# Patient Record
Sex: FEMALE | Race: BLACK OR AFRICAN AMERICAN | NOT HISPANIC OR LATINO | Employment: OTHER | URBAN - METROPOLITAN AREA
[De-identification: names, ages, dates, MRNs, and addresses within clinical notes are randomized per-mention and may not be internally consistent; named-entity substitution may affect disease eponyms.]

---

## 2023-06-19 PROBLEM — K57.92 ACUTE DIVERTICULITIS: Status: RESOLVED | Noted: 2023-06-19 | Resolved: 2023-06-19

## 2023-06-19 PROBLEM — E66.812 CLASS 2 SEVERE OBESITY WITH BODY MASS INDEX (BMI) OF 35 TO 39.9 WITH SERIOUS COMORBIDITY: Status: ACTIVE | Noted: 2023-06-19

## 2023-06-19 PROBLEM — I10 HTN (HYPERTENSION): Status: ACTIVE | Noted: 2023-06-19

## 2023-06-19 PROBLEM — E11.9 DIABETES MELLITUS TYPE 2, UNCOMPLICATED (MULTI): Status: ACTIVE | Noted: 2023-06-19

## 2023-06-19 PROBLEM — G47.30 MILD SLEEP APNEA: Status: ACTIVE | Noted: 2023-06-19

## 2023-06-19 PROBLEM — N32.81 OAB (OVERACTIVE BLADDER): Status: ACTIVE | Noted: 2023-06-19

## 2023-06-19 PROBLEM — S33.5XXA LUMBAR BACK SPRAIN: Status: RESOLVED | Noted: 2023-06-19 | Resolved: 2023-06-19

## 2023-06-19 PROBLEM — G47.30 SLEEP-DISORDERED BREATHING: Status: ACTIVE | Noted: 2023-06-19

## 2023-06-19 PROBLEM — M75.20 BICEPS TENDINITIS: Status: ACTIVE | Noted: 2023-06-19

## 2023-06-19 PROBLEM — R23.3 PETECHIAE: Status: ACTIVE | Noted: 2023-06-19

## 2023-06-19 PROBLEM — M54.16 LUMBAR RADICULOPATHY: Status: ACTIVE | Noted: 2023-06-19

## 2023-06-19 PROBLEM — J30.9 ALLERGIC RHINITIS: Status: ACTIVE | Noted: 2023-06-19

## 2023-06-19 PROBLEM — R10.9 ABDOMINAL CRAMPING: Status: RESOLVED | Noted: 2023-06-19 | Resolved: 2023-06-19

## 2023-06-19 PROBLEM — F32.A DEPRESSION: Status: RESOLVED | Noted: 2023-06-19 | Resolved: 2023-06-19

## 2023-06-19 PROBLEM — E78.5 DYSLIPIDEMIA: Status: ACTIVE | Noted: 2023-06-19

## 2023-06-19 PROBLEM — M25.519 SHOULDER PAIN: Status: RESOLVED | Noted: 2023-06-19 | Resolved: 2023-06-19

## 2023-06-19 PROBLEM — K11.21 ACUTE PAROTITIS: Status: RESOLVED | Noted: 2023-06-19 | Resolved: 2023-06-19

## 2023-06-19 PROBLEM — N83.8 OVARIAN MASS, RIGHT: Status: ACTIVE | Noted: 2023-06-19

## 2023-06-19 PROBLEM — F32.0 DEPRESSION, MAJOR, SINGLE EPISODE, MILD (CMS-HCC): Status: ACTIVE | Noted: 2023-06-19

## 2023-06-19 PROBLEM — E66.01 CLASS 2 SEVERE OBESITY WITH BODY MASS INDEX (BMI) OF 35 TO 39.9 WITH SERIOUS COMORBIDITY (MULTI): Status: ACTIVE | Noted: 2023-06-19

## 2023-06-19 PROBLEM — R07.89 ATYPICAL CHEST PAIN: Status: RESOLVED | Noted: 2023-06-19 | Resolved: 2023-06-19

## 2023-06-19 PROBLEM — K59.00 CONSTIPATION: Status: ACTIVE | Noted: 2023-06-19

## 2023-06-19 PROBLEM — M51.379 DEGENERATION OF INTERVERTEBRAL DISC AT L5-S1 LEVEL: Status: ACTIVE | Noted: 2023-06-19

## 2023-06-19 PROBLEM — N17.9 AKI (ACUTE KIDNEY INJURY) (CMS-HCC): Status: ACTIVE | Noted: 2023-06-19

## 2023-06-19 PROBLEM — E11.22 DIABETES MELLITUS WITH CHRONIC KIDNEY DISEASE (MULTI): Status: RESOLVED | Noted: 2023-06-19 | Resolved: 2023-06-19

## 2023-06-19 PROBLEM — R94.39 ABNORMAL STRESS TEST: Status: RESOLVED | Noted: 2023-06-19 | Resolved: 2023-06-19

## 2023-06-19 PROBLEM — E66.9 OBESITY: Status: RESOLVED | Noted: 2023-06-19 | Resolved: 2023-06-19

## 2023-06-19 PROBLEM — M79.606 LEG PAIN: Status: RESOLVED | Noted: 2023-06-19 | Resolved: 2023-06-19

## 2023-06-19 PROBLEM — M51.37 DEGENERATION OF INTERVERTEBRAL DISC AT L5-S1 LEVEL: Status: ACTIVE | Noted: 2023-06-19

## 2023-06-19 PROBLEM — M47.816 DEGENERATIVE ARTHRITIS OF LUMBAR SPINE: Status: ACTIVE | Noted: 2023-06-19

## 2023-06-19 PROBLEM — K57.32 DIVERTICULITIS, COLON: Status: ACTIVE | Noted: 2023-06-19

## 2023-06-19 PROBLEM — N18.31 CHRONIC KIDNEY DISEASE (CKD) STAGE G3A/A1, MODERATELY DECREASED GLOMERULAR FILTRATION RATE (GFR) BETWEEN 45-59 ML/MIN/1.73 SQUARE METER AND ALBUMINURIA CREATININE RATIO LESS THAN 30 MG/G (CMS* (MULTI): Status: ACTIVE | Noted: 2023-06-19

## 2023-06-19 PROBLEM — R00.0 TACHYCARDIA: Status: ACTIVE | Noted: 2023-06-19

## 2023-06-19 PROBLEM — K57.30 COLONIC DIVERTICULAR DISEASE: Status: ACTIVE | Noted: 2023-06-19

## 2023-06-19 PROBLEM — N39.0 ACUTE LOWER UTI: Status: RESOLVED | Noted: 2023-06-19 | Resolved: 2023-06-19

## 2023-06-19 PROBLEM — N39.0 UTI (URINARY TRACT INFECTION): Status: RESOLVED | Noted: 2023-06-19 | Resolved: 2023-06-19

## 2023-06-19 RX ORDER — MAGNESIUM GLUCONATE 27.5 (500)
1 TABLET ORAL DAILY
COMMUNITY
Start: 2022-07-14

## 2023-06-19 RX ORDER — PSYLLIUM HUSK 3.4 G/5.8G
POWDER ORAL 2 TIMES DAILY
COMMUNITY
Start: 2020-10-08

## 2023-06-19 RX ORDER — ASPIRIN 81 MG/1
TABLET ORAL
COMMUNITY
Start: 2018-11-01

## 2023-06-19 RX ORDER — ACETAMINOPHEN 500 MG
1 TABLET ORAL DAILY
COMMUNITY
Start: 2022-07-14

## 2023-06-19 RX ORDER — POLYETHYLENE GLYCOL 3350, SODIUM SULFATE ANHYDROUS, SODIUM BICARBONATE, SODIUM CHLORIDE, POTASSIUM CHLORIDE 236; 22.74; 6.74; 5.86; 2.97 G/4L; G/4L; G/4L; G/4L; G/4L
POWDER, FOR SOLUTION ORAL
COMMUNITY
Start: 2021-11-09

## 2023-06-19 RX ORDER — AMLODIPINE BESYLATE 10 MG/1
10 TABLET ORAL DAILY
COMMUNITY

## 2023-06-19 RX ORDER — SOLIFENACIN SUCCINATE 10 MG/1
1 TABLET, FILM COATED ORAL NIGHTLY
COMMUNITY
Start: 2018-11-01 | End: 2024-02-08

## 2023-06-19 RX ORDER — FLUTICASONE PROPIONATE 50 MCG
2 SPRAY, SUSPENSION (ML) NASAL DAILY
COMMUNITY
Start: 2022-11-07 | End: 2024-01-30 | Stop reason: SDUPTHER

## 2023-06-19 RX ORDER — CHONDROITIN/COLLAGEN/HYALURON 500 MG
1 CAPSULE ORAL DAILY
COMMUNITY
Start: 2022-07-14

## 2023-06-19 RX ORDER — BLOOD SUGAR DIAGNOSTIC
STRIP MISCELLANEOUS 2 TIMES DAILY
COMMUNITY
Start: 2018-11-05

## 2023-06-19 RX ORDER — MULTIVIT-MIN/IRON/FOLIC ACID/K 18-600-40
1 CAPSULE ORAL DAILY
COMMUNITY
Start: 2022-07-14

## 2023-06-19 RX ORDER — METOPROLOL SUCCINATE 50 MG/1
50 TABLET, EXTENDED RELEASE ORAL DAILY
COMMUNITY
End: 2023-11-30

## 2023-06-19 RX ORDER — TRIAMCINOLONE ACETONIDE 1 MG/G
CREAM TOPICAL
COMMUNITY
Start: 2022-12-21

## 2023-06-19 RX ORDER — CHLORTHALIDONE 25 MG/1
25 TABLET ORAL DAILY
COMMUNITY

## 2023-06-19 RX ORDER — ACETAMINOPHEN, DIPHENHYDRAMINE HCL, PHENYLEPHRINE HCL 325; 25; 5 MG/1; MG/1; MG/1
1 TABLET ORAL NIGHTLY
COMMUNITY
Start: 2022-07-14

## 2023-06-19 RX ORDER — OLMESARTAN MEDOXOMIL 40 MG/1
1 TABLET ORAL DAILY
COMMUNITY
Start: 2018-11-01

## 2023-06-19 RX ORDER — ROSUVASTATIN CALCIUM 5 MG/1
5 TABLET, COATED ORAL DAILY
COMMUNITY
End: 2023-06-22 | Stop reason: ALTCHOICE

## 2023-06-20 ENCOUNTER — OFFICE VISIT (OUTPATIENT)
Dept: PRIMARY CARE | Facility: CLINIC | Age: 63
End: 2023-06-20
Payer: MEDICARE

## 2023-06-20 VITALS
BODY MASS INDEX: 36.96 KG/M2 | SYSTOLIC BLOOD PRESSURE: 104 MMHG | DIASTOLIC BLOOD PRESSURE: 71 MMHG | HEIGHT: 66 IN | WEIGHT: 230 LBS

## 2023-06-20 DIAGNOSIS — E11.9 TYPE 2 DIABETES MELLITUS WITHOUT COMPLICATION, WITHOUT LONG-TERM CURRENT USE OF INSULIN (MULTI): Primary | ICD-10-CM

## 2023-06-20 DIAGNOSIS — N17.9 AKI (ACUTE KIDNEY INJURY) (CMS-HCC): ICD-10-CM

## 2023-06-20 DIAGNOSIS — M54.16 LUMBAR RADICULOPATHY: ICD-10-CM

## 2023-06-20 DIAGNOSIS — N18.31 CHRONIC KIDNEY DISEASE (CKD) STAGE G3A/A1, MODERATELY DECREASED GLOMERULAR FILTRATION RATE (GFR) BETWEEN 45-59 ML/MIN/1.73 SQUARE METER AND ALBUMINURIA CREATININE RATIO LESS THAN 30 MG/G (CMS* (MULTI): ICD-10-CM

## 2023-06-20 DIAGNOSIS — E66.01 CLASS 2 SEVERE OBESITY WITH BODY MASS INDEX (BMI) OF 35 TO 39.9 WITH SERIOUS COMORBIDITY (MULTI): ICD-10-CM

## 2023-06-20 DIAGNOSIS — M51.37 DEGENERATION OF INTERVERTEBRAL DISC AT L5-S1 LEVEL: ICD-10-CM

## 2023-06-20 DIAGNOSIS — N32.81 OAB (OVERACTIVE BLADDER): ICD-10-CM

## 2023-06-20 DIAGNOSIS — E78.5 DYSLIPIDEMIA: ICD-10-CM

## 2023-06-20 DIAGNOSIS — I10 PRIMARY HYPERTENSION: ICD-10-CM

## 2023-06-20 DIAGNOSIS — A09 DIARRHEA OF INFECTIOUS ORIGIN: ICD-10-CM

## 2023-06-20 DIAGNOSIS — F32.0 DEPRESSION, MAJOR, SINGLE EPISODE, MILD (CMS-HCC): ICD-10-CM

## 2023-06-20 DIAGNOSIS — Z12.31 VISIT FOR SCREENING MAMMOGRAM: ICD-10-CM

## 2023-06-20 LAB
ALANINE AMINOTRANSFERASE (SGPT) (U/L) IN SER/PLAS: 18 U/L (ref 7–45)
ALBUMIN (G/DL) IN SER/PLAS: 4.5 G/DL (ref 3.4–5)
ALKALINE PHOSPHATASE (U/L) IN SER/PLAS: 57 U/L (ref 33–136)
ANION GAP IN SER/PLAS: 13 MMOL/L (ref 10–20)
ASPARTATE AMINOTRANSFERASE (SGOT) (U/L) IN SER/PLAS: 17 U/L (ref 9–39)
BILIRUBIN TOTAL (MG/DL) IN SER/PLAS: 0.3 MG/DL (ref 0–1.2)
CALCIUM (MG/DL) IN SER/PLAS: 9.5 MG/DL (ref 8.6–10.6)
CARBON DIOXIDE, TOTAL (MMOL/L) IN SER/PLAS: 30 MMOL/L (ref 21–32)
CHLORIDE (MMOL/L) IN SER/PLAS: 103 MMOL/L (ref 98–107)
CHOLESTEROL (MG/DL) IN SER/PLAS: 182 MG/DL (ref 0–199)
CHOLESTEROL IN HDL (MG/DL) IN SER/PLAS: 34.3 MG/DL
CHOLESTEROL/HDL RATIO: 5.3
CREATININE (MG/DL) IN SER/PLAS: 0.97 MG/DL (ref 0.5–1.05)
GFR FEMALE: 65 ML/MIN/1.73M2
GLUCOSE (MG/DL) IN SER/PLAS: 103 MG/DL (ref 74–99)
LDL: 104 MG/DL (ref 0–99)
NON HDL CHOLESTEROL: 148 MG/DL
POC HEMOGLOBIN A1C: 5.8 % (ref 4.2–6.5)
POTASSIUM (MMOL/L) IN SER/PLAS: 3.7 MMOL/L (ref 3.5–5.3)
PROTEIN TOTAL: 7.6 G/DL (ref 6.4–8.2)
SODIUM (MMOL/L) IN SER/PLAS: 142 MMOL/L (ref 136–145)
TRIGLYCERIDE (MG/DL) IN SER/PLAS: 217 MG/DL (ref 0–149)
UREA NITROGEN (MG/DL) IN SER/PLAS: 13 MG/DL (ref 6–23)
VLDL: 43 MG/DL (ref 0–40)

## 2023-06-20 PROCEDURE — 99214 OFFICE O/P EST MOD 30 MIN: CPT | Performed by: FAMILY MEDICINE

## 2023-06-20 PROCEDURE — 3074F SYST BP LT 130 MM HG: CPT | Performed by: FAMILY MEDICINE

## 2023-06-20 PROCEDURE — 1036F TOBACCO NON-USER: CPT | Performed by: FAMILY MEDICINE

## 2023-06-20 PROCEDURE — 4010F ACE/ARB THERAPY RXD/TAKEN: CPT | Performed by: FAMILY MEDICINE

## 2023-06-20 PROCEDURE — 80061 LIPID PANEL: CPT

## 2023-06-20 PROCEDURE — 83036 HEMOGLOBIN GLYCOSYLATED A1C: CPT | Performed by: FAMILY MEDICINE

## 2023-06-20 PROCEDURE — 80053 COMPREHEN METABOLIC PANEL: CPT

## 2023-06-20 PROCEDURE — 3078F DIAST BP <80 MM HG: CPT | Performed by: FAMILY MEDICINE

## 2023-06-20 NOTE — PROGRESS NOTES
Assessment and Plan:  Problem List Items Addressed This Visit       CRISS (acute kidney injury) (CMS/[facility])    Chronic kidney disease (CKD) stage G3a/A1, moderately decreased glomerular filtration rate (GFR) between 45-59 mL/min/1.73 square meter and albuminuria creatinine ratio less than 30 mg/g    Current Assessment & Plan     Continue medication         Class 2 severe obesity with body mass index (BMI) of 35 to 39.9 with serious comorbidity (CMS/[facility])    Current Assessment & Plan     Watch what you eat and include more vegetables on your plate.  Portion control and exercise will help in loosing weight .   It is recommended to get 150 mins of brisk exercise in a week . 150 mins of exercise per week will help in maintaining your current weight, if you are already working out . Exercise should make your heart rate go up.   Calorie deficit ( spending more calories than eating ) will result in weight loss    A deficit of 500 calories per day in 7 days would results in 1lbs weight loss per week., Aim for 1-2 lbs weight loss per month.   You can reduce the calorie intake by 250 calories and spend 250 calories by working out which would give you a total deficit of 500 calories     No sugary/ sweetened beverages , portion control , dedicated physical activity atleast 5 times a week advised .            Degeneration of intervertebral disc at L5-S1 level    Depression, major, single episode, mild (CMS/[facility])    Diabetes mellitus type 2, uncomplicated (CMS/[facility]) - Primary    Current Assessment & Plan     Will continue metformin and add Ozempic side effects such as nausea discussed with the patient will call back in 4 weeks to get the next dose         Relevant Medications    semaglutide 0.25 mg or 0.5 mg (2 mg/3 mL) pen injector    Other Relevant Orders    Comprehensive Metabolic Panel (Completed)    Lipid Panel (Completed)    POCT glycosylated hemoglobin (Hb A1C) manually resulted (Completed)    Dyslipidemia    HTN (hypertension)  "   Current Assessment & Plan     Continue medication         Lumbar radiculopathy    OAB (overactive bladder)    Visit for screening mammogram    Relevant Orders    BI mammo bilateral screening tomosynthesis    RESOLVED: Diarrhea of infectious origin     Follow up pending results or in October    HPI:  Here for follow up Eating better.  He is trying to be more active  Continues to have back pain knee pain A1c today was 5.8.        ROS   Constitutional:  o weight loss. Negative for chills and fever.   HENT: Negative.     Respiratory: Negative.     Cardiovascular: Negative.    Gastrointestinal: Negative.  Negative for nausea and vomiting.   Endocrine: Negative.    Genitourinary: Negative.    Musculoskeletal: back pain   Skin: Negative.  Negative for rash.   Allergic/Immunologic: Negative.    Neurological: Negative.    Hematological: Negative.    Psychiatric/Behavioral: Negative.     All other systems reviewed and are negative.      Blood Pressure 104/71   Height 1.676 m (5' 6\")   Weight 104 kg (230 lb)   Body Mass Index 37.12 kg/m²   Body mass index is 37.12 kg/m².  Physical Exam    ENT:      Head: Normocephalic and atraumatic.      Right Ear: Tympanic membrane normal.      Left Ear: Tympanic membrane normal.      Nose: Nose normal.      Mouth/Throat:      Mouth: Mucous membranes are moist.   Eyes:      Pupils: Pupils are equal, round, and reactive to light.   Cardiovascular:      Rate and Rhythm: Normal rate and regular rhythm.      Pulses: Normal pulses.      Heart sounds: Normal heart sounds.   Pulmonary:      Effort: Pulmonary effort is normal.      Breath sounds: Normal breath sounds.   Abdominal:      General: Abdomen is flat. Bowel sounds are normal.      Palpations: Abdomen is soft.   Musculoskeletal:         General: Normal range of motion.      Cervical back: Normal range of motion and neck supple.   Skin:     General: Skin is warm and dry.      Capillary Refill: Capillary refill takes less than 2 " seconds.   Neurological:      General: No focal deficit present.      Mental Status: She is alert and oriented to person, place, and time.   Psychiatric:         Mood and Affect: Mood normal.       Active Problem List  Patient Active Problem List   Diagnosis    Constipation    CRISS (acute kidney injury) (CMS/HCC)    Allergic rhinitis    Biceps tendinitis    Chronic kidney disease (CKD) stage G3a/A1, moderately decreased glomerular filtration rate (GFR) between 45-59 mL/min/1.73 square meter and albuminuria creatinine ratio less than 30 mg/g    Class 2 severe obesity with body mass index (BMI) of 35 to 39.9 with serious comorbidity (CMS/HCC)    Colonic diverticular disease    Degeneration of intervertebral disc at L5-S1 level    Degenerative arthritis of lumbar spine    Depression, major, single episode, mild (CMS/HCC)    Diabetes mellitus type 2, uncomplicated (CMS/HCC)    Diverticulitis, colon    Dyslipidemia    HTN (hypertension)    Mild sleep apnea    Lumbar radiculopathy    OAB (overactive bladder)    Petechiae    Sleep-disordered breathing    Tachycardia    Visit for screening mammogram       Comprehensive Medical/Surgical/Social/Family History  Past Medical History:   Diagnosis Date    Abdominal cramping 2023    Acute diverticulitis 2023    Acute lower UTI 2023    Acute parotitis 2023    Diabetes mellitus with chronic kidney disease (CMS/HCC) 2023    Low back pain, unspecified 2017    Back pain, lumbosacral    Lumbar back sprain 2023    Obesity 2023    Personal history of other diseases of the circulatory system 2017    History of hypertension    Personal history of other diseases of the musculoskeletal system and connective tissue 2017    History of low back pain    Shoulder pain 2023     Past Surgical History:   Procedure Laterality Date     SECTION, CLASSIC  10/29/2014     Section    DILATION AND CURETTAGE OF UTERUS  10/29/2014     Dilation And Curettage    HYSTERECTOMY  10/29/2014    Hysterectomy    LAPAROSCOPY DIAGNOSTIC / BIOPSY / ASPIRATION / LYSIS  10/29/2014    Laparoscopy (Diagnostic)    TONSILLECTOMY  10/29/2014    Tonsillectomy     Social History     Social History Narrative    Not on file       Allergies and Medications  Patient has no known allergies.  Current Outpatient Medications   Medication Instructions    amLODIPine (NORVASC) 10 mg, oral, Daily    ascorbic acid, vitamin C, 500 mg capsule 1 tablet, oral, Daily    aspirin 81 mg EC tablet oral    chlorthalidone (HYGROTON) 25 mg, oral, Daily    cholecalciferol (Vitamin D-3) 5,000 Units tablet 1 tablet, oral, Daily    fluticasone (Flonase) 50 mcg/actuation nasal spray 2 sprays, Each Nostril, Daily    magnesium gluconate (Magonate) 27.5 mg magne- sium (500 mg) tablet 1 tablet, oral, Daily    melatonin 10 mg tablet 1 tablet, oral, Nightly    metFORMIN (GLUCOPHAGE) 500 mg, oral, 2 times daily    metoprolol succinate XL (TOPROL-XL) 50 mg, oral, Daily    multivit-min-mfolate-[id number] (Alive Women's 50 Plus Ultra) 800 mcg DFE- 150 mcg tablet 1 tablet, oral, Daily    olmesartan (BENIcar) 40 mg tablet 1 tablet, oral, Daily    OneTouch Ultra Test strip 2 times daily    polyethylene glycol-electrolytes (polyethylene glycol) 420 gram solution oral    psyllium husk, aspartame, (Metamucil Sugar-Free, aspart,) 3.4 gram/5.8 gram powder oral, 2 times daily    rosuvastatin (CRESTOR) 5 mg, oral, Daily    semaglutide 0.25 mg, subcutaneous, Weekly    solifenacin (VESIcare) 10 mg tablet 1 tablet, oral, Nightly    triamcinolone (Kenalog) 0.1 % cream apply and rub in a thin flim to affected areas twice daily (am and pm)

## 2023-06-21 PROBLEM — N83.8 OVARIAN MASS, RIGHT: Status: RESOLVED | Noted: 2023-06-19 | Resolved: 2023-06-21

## 2023-06-21 PROBLEM — A09 DIARRHEA OF INFECTIOUS ORIGIN: Status: ACTIVE | Noted: 2023-06-21

## 2023-06-21 PROBLEM — Z12.31 VISIT FOR SCREENING MAMMOGRAM: Status: ACTIVE | Noted: 2023-06-21

## 2023-06-22 DIAGNOSIS — E78.5 DYSLIPIDEMIA: ICD-10-CM

## 2023-06-22 PROBLEM — A09 DIARRHEA OF INFECTIOUS ORIGIN: Status: RESOLVED | Noted: 2023-06-21 | Resolved: 2023-06-22

## 2023-06-22 RX ORDER — ROSUVASTATIN CALCIUM 10 MG/1
10 TABLET, COATED ORAL DAILY
Qty: 90 TABLET | Refills: 1 | Status: SHIPPED | OUTPATIENT
Start: 2023-06-22 | End: 2024-06-21

## 2023-06-22 ASSESSMENT — PATIENT HEALTH QUESTIONNAIRE - PHQ9
2. FEELING DOWN, DEPRESSED OR HOPELESS: NOT AT ALL
SUM OF ALL RESPONSES TO PHQ9 QUESTIONS 1 AND 2: 0
1. LITTLE INTEREST OR PLEASURE IN DOING THINGS: NOT AT ALL

## 2023-06-22 NOTE — ASSESSMENT & PLAN NOTE
Will continue metformin and add Ozempic side effects such as nausea discussed with the patient will call back in 4 weeks to get the next dose

## 2023-07-28 DIAGNOSIS — E11.9 TYPE 2 DIABETES MELLITUS WITHOUT COMPLICATION, WITHOUT LONG-TERM CURRENT USE OF INSULIN (MULTI): ICD-10-CM

## 2023-07-30 DIAGNOSIS — E11.9 TYPE 2 DIABETES MELLITUS WITHOUT COMPLICATION, WITHOUT LONG-TERM CURRENT USE OF INSULIN (MULTI): ICD-10-CM

## 2023-07-30 RX ORDER — SEMAGLUTIDE 0.68 MG/ML
INJECTION, SOLUTION SUBCUTANEOUS
Qty: 3 ML | Refills: 1 | OUTPATIENT
Start: 2023-07-30

## 2023-08-04 ENCOUNTER — OFFICE VISIT (OUTPATIENT)
Dept: PRIMARY CARE | Facility: CLINIC | Age: 63
End: 2023-08-04
Payer: MEDICARE

## 2023-08-04 VITALS
HEART RATE: 81 BPM | DIASTOLIC BLOOD PRESSURE: 69 MMHG | HEIGHT: 65 IN | BODY MASS INDEX: 37.32 KG/M2 | WEIGHT: 224 LBS | SYSTOLIC BLOOD PRESSURE: 101 MMHG

## 2023-08-04 DIAGNOSIS — I10 PRIMARY HYPERTENSION: ICD-10-CM

## 2023-08-04 DIAGNOSIS — N94.89 ADNEXAL MASS: Primary | ICD-10-CM

## 2023-08-04 DIAGNOSIS — N18.31 CHRONIC KIDNEY DISEASE (CKD) STAGE G3A/A1, MODERATELY DECREASED GLOMERULAR FILTRATION RATE (GFR) BETWEEN 45-59 ML/MIN/1.73 SQUARE METER AND ALBUMINURIA CREATININE RATIO LESS THAN 30 MG/G (CMS* (MULTI): ICD-10-CM

## 2023-08-04 DIAGNOSIS — M54.16 LUMBAR RADICULOPATHY: ICD-10-CM

## 2023-08-04 DIAGNOSIS — E66.01 CLASS 2 SEVERE OBESITY WITH BODY MASS INDEX (BMI) OF 35 TO 39.9 WITH SERIOUS COMORBIDITY (MULTI): ICD-10-CM

## 2023-08-04 PROCEDURE — 1036F TOBACCO NON-USER: CPT | Performed by: FAMILY MEDICINE

## 2023-08-04 PROCEDURE — 4010F ACE/ARB THERAPY RXD/TAKEN: CPT | Performed by: FAMILY MEDICINE

## 2023-08-04 PROCEDURE — 3074F SYST BP LT 130 MM HG: CPT | Performed by: FAMILY MEDICINE

## 2023-08-04 PROCEDURE — 99214 OFFICE O/P EST MOD 30 MIN: CPT | Performed by: FAMILY MEDICINE

## 2023-08-04 PROCEDURE — 3078F DIAST BP <80 MM HG: CPT | Performed by: FAMILY MEDICINE

## 2023-08-05 PROBLEM — N94.89 ADNEXAL MASS: Status: ACTIVE | Noted: 2023-08-05

## 2023-08-05 NOTE — ASSESSMENT & PLAN NOTE
Had a CT scan in 2020 with similar findings subsequent ultrasound had shown the size of the right lower rate to be 4 cm similar to the current size will get another ultrasound to reevaluate patient had a hysterectomy and had history of PCOS prior to that

## 2023-08-05 NOTE — PROGRESS NOTES
Assessment and Plan:  Problem List Items Addressed This Visit       Chronic kidney disease (CKD) stage G3a/A1, moderately decreased glomerular filtration rate (GFR) between 45-59 mL/min/1.73 square meter and albuminuria creatinine ratio less than 30 mg/g (CMS/[facility])    Current Assessment & Plan     Avoid nephrotoxic agents monitor renal function         Class 2 severe obesity with body mass index (BMI) of 35 to 39.9 with serious comorbidity (CMS/[facility])    Current Assessment & Plan     Has lost 6 pounds since starting Ozempic continue same recommend continuing metformin also         HTN (hypertension)    Current Assessment & Plan     Blood pressure controlled         Lumbar radiculopathy    Current Assessment & Plan     Does not want any imaging done at this time no use lidocaine patches heat massage and home exercises         Adnexal mass - Primary    Current Assessment & Plan     Had a CT scan in 2020 with similar findings subsequent ultrasound had shown the size of the right lower rate to be 4 cm similar to the current size will get another ultrasound to reevaluate patient had a hysterectomy and had history of PCOS prior to that         Relevant Orders    US PELVIS TRANSABDOMINAL WITH TRANSVAGINAL     Further recommendations pending results    HPI: Patient is here for follow-up from the ER went to the ER with back pain had a CAT scan of the abdomen and pelvis which showed an 4 cm adnexal mass states the pain is in the lower back radiating to the right flank no radiation to the lower extremity no numbness tingling has history of lumbar radiculopathy now imaging done of the lumbar spine was seen pain management in the past        ROS   Constitutional:  o weight loss. Negative for chills and fever.   HENT: Negative.     Respiratory: Negative.     Cardiovascular: Negative.    Gastrointestinal: Negative.  Negative for nausea and vomiting.   Endocrine: Negative.    Genitourinary: Negative.    Musculoskeletal: Negative.   "  Skin: Negative.  Negative for rash.   Allergic/Immunologic: Negative.    Neurological: Negative.    Hematological: Negative.    Psychiatric/Behavioral: Negative.     All other systems reviewed and are negative.      Blood Pressure 101/69   Pulse 81   Height 1.651 m (5' 5\")   Weight 102 kg (224 lb)   Body Mass Index 37.28 kg/m²   Body mass index is 37.28 kg/m².  Physical Exam    ENT:      Head: Normocephalic and atraumatic.      Right Ear: Tympanic membrane normal.      Left Ear: Tympanic membrane normal.      Nose: Nose normal.      Mouth/Throat:      Mouth: Mucous membranes are moist.   Eyes:      Pupils: Pupils are equal, round, and reactive to light.   Cardiovascular:      Rate and Rhythm: Normal rate and regular rhythm.      Pulses: Normal pulses.      Heart sounds: Normal heart sounds.   Pulmonary:      Effort: Pulmonary effort is normal.      Breath sounds: Normal breath sounds.   Abdominal:      General: Abdomen is flat. Bowel sounds are normal.      Palpations: Abdomen is soft.   Musculoskeletal:         General: Normal range of motion.      Cervical back: Normal range of motion and neck supple.   Skin:     General: Skin is warm and dry.      Capillary Refill: Capillary refill takes less than 2 seconds.   Neurological:      General: No focal deficit present.      Mental Status: She is alert and oriented to person, place, and time.   Psychiatric:         Mood and Affect: Mood normal.       Active Problem List  Patient Active Problem List   Diagnosis    Constipation    CRISS (acute kidney injury) (CMS/[facility])    Allergic rhinitis    Biceps tendinitis    Chronic kidney disease (CKD) stage G3a/A1, moderately decreased glomerular filtration rate (GFR) between 45-59 mL/min/1.73 square meter and albuminuria creatinine ratio less than 30 mg/g (CMS/[facility])    Class 2 severe obesity with body mass index (BMI) of 35 to 39.9 with serious comorbidity (CMS/[facility])    Colonic diverticular disease    Degeneration of " intervertebral disc at L5-S1 level    Degenerative arthritis of lumbar spine    Depression, major, single episode, mild (CMS/[facility])    Diabetes mellitus type 2, uncomplicated (CMS/[facility])    Diverticulitis, colon    Dyslipidemia    HTN (hypertension)    Mild sleep apnea    Lumbar radiculopathy    OAB (overactive bladder)    Petechiae    Sleep-disordered breathing    Tachycardia    Visit for screening mammogram    Adnexal mass       Comprehensive Medical/Surgical/Social/Family History  Past Medical History:   Diagnosis Date    Abdominal cramping 2023    Acute diverticulitis 2023    Acute lower UTI 2023    Acute parotitis 2023    Diabetes mellitus with chronic kidney disease (CMS/[facility]) 2023    Low back pain, unspecified 2017    Back pain, lumbosacral    Lumbar back sprain 2023    Obesity 2023    Personal history of other diseases of the circulatory system 2017    History of hypertension    Personal history of other diseases of the musculoskeletal system and connective tissue 2017    History of low back pain    Shoulder pain 2023     Past Surgical History:   Procedure Laterality Date     SECTION, CLASSIC  10/29/2014     Section    DILATION AND CURETTAGE OF UTERUS  10/29/2014    Dilation And Curettage    HYSTERECTOMY  10/29/2014    Hysterectomy    LAPAROSCOPY DIAGNOSTIC / BIOPSY / ASPIRATION / LYSIS  10/29/2014    Laparoscopy (Diagnostic)    TONSILLECTOMY  10/29/2014    Tonsillectomy     Social History     Social History Narrative    Not on file       Allergies and Medications  Patient has no known allergies.  Current Outpatient Medications   Medication Instructions    amLODIPine (NORVASC) 10 mg, oral, Daily    ascorbic acid, vitamin C, 500 mg capsule 1 tablet, oral, Daily    aspirin 81 mg EC tablet oral    chlorthalidone (HYGROTON) 25 mg, oral, Daily    cholecalciferol (Vitamin D-3) 5,000 Units tablet 1 tablet, oral, Daily    fluticasone  (Flonase) 50 mcg/actuation nasal spray 2 sprays, Each Nostril, Daily    magnesium gluconate (Magonate) 27.5 mg magne- sium (500 mg) tablet 1 tablet, oral, Daily    melatonin 10 mg tablet 1 tablet, oral, Nightly    metFORMIN (GLUCOPHAGE) 500 mg, oral, 2 times daily    metoprolol succinate XL (TOPROL-XL) 50 mg, oral, Daily    multivit-min-mfolate-[id number] (Alive Women's 50 Plus Ultra) 800 mcg DFE- 150 mcg tablet 1 tablet, oral, Daily    olmesartan (BENIcar) 40 mg tablet 1 tablet, oral, Daily    OneTouch Ultra Test strip 2 times daily    polyethylene glycol-electrolytes (polyethylene glycol) 420 gram solution oral    psyllium husk, aspartame, (Metamucil Sugar-Free, aspart,) 3.4 gram/5.8 gram powder oral, 2 times daily    rosuvastatin (CRESTOR) 10 mg, oral, Daily    semaglutide 0.5 mg, subcutaneous, Weekly    solifenacin (VESIcare) 10 mg tablet 1 tablet, oral, Nightly    triamcinolone (Kenalog) 0.1 % cream apply and rub in a thin flim to affected areas twice daily (am and pm)

## 2023-08-05 NOTE — ASSESSMENT & PLAN NOTE
Does not want any imaging done at this time no use lidocaine patches heat massage and home exercises

## 2023-10-13 DIAGNOSIS — E11.9 TYPE 2 DIABETES MELLITUS WITHOUT COMPLICATION, WITHOUT LONG-TERM CURRENT USE OF INSULIN (MULTI): ICD-10-CM

## 2023-10-13 DIAGNOSIS — E11.9 TYPE 2 DIABETES MELLITUS WITHOUT COMPLICATION, WITHOUT LONG-TERM CURRENT USE OF INSULIN (MULTI): Primary | ICD-10-CM

## 2023-10-17 RX ORDER — SEMAGLUTIDE 0.68 MG/ML
INJECTION, SOLUTION SUBCUTANEOUS
Qty: 3 ML | Refills: 1 | OUTPATIENT
Start: 2023-10-17

## 2023-10-24 ENCOUNTER — APPOINTMENT (OUTPATIENT)
Dept: PRIMARY CARE | Facility: CLINIC | Age: 63
End: 2023-10-24
Payer: MEDICARE

## 2023-10-30 ENCOUNTER — OFFICE VISIT (OUTPATIENT)
Dept: PRIMARY CARE | Facility: CLINIC | Age: 63
End: 2023-10-30
Payer: MEDICARE

## 2023-10-30 VITALS
WEIGHT: 219 LBS | DIASTOLIC BLOOD PRESSURE: 82 MMHG | HEIGHT: 65 IN | HEART RATE: 88 BPM | BODY MASS INDEX: 36.49 KG/M2 | SYSTOLIC BLOOD PRESSURE: 138 MMHG

## 2023-10-30 DIAGNOSIS — I10 PRIMARY HYPERTENSION: ICD-10-CM

## 2023-10-30 DIAGNOSIS — E11.9 TYPE 2 DIABETES MELLITUS WITHOUT COMPLICATION, UNSPECIFIED WHETHER LONG TERM INSULIN USE (MULTI): ICD-10-CM

## 2023-10-30 DIAGNOSIS — N18.31 CHRONIC KIDNEY DISEASE (CKD) STAGE G3A/A1, MODERATELY DECREASED GLOMERULAR FILTRATION RATE (GFR) BETWEEN 45-59 ML/MIN/1.73 SQUARE METER AND ALBUMINURIA CREATININE RATIO LESS THAN 30 MG/G (CMS* (MULTI): ICD-10-CM

## 2023-10-30 DIAGNOSIS — E78.5 DYSLIPIDEMIA: ICD-10-CM

## 2023-10-30 DIAGNOSIS — E66.01 CLASS 2 SEVERE OBESITY WITH BODY MASS INDEX (BMI) OF 35 TO 39.9 WITH SERIOUS COMORBIDITY (MULTI): ICD-10-CM

## 2023-10-30 DIAGNOSIS — Z00.00 ROUTINE GENERAL MEDICAL EXAMINATION AT A HEALTH CARE FACILITY: Primary | ICD-10-CM

## 2023-10-30 DIAGNOSIS — Z23 INFLUENZA VACCINE ADMINISTERED: ICD-10-CM

## 2023-10-30 DIAGNOSIS — E11.9 TYPE 2 DIABETES MELLITUS WITHOUT COMPLICATION, WITHOUT LONG-TERM CURRENT USE OF INSULIN (MULTI): ICD-10-CM

## 2023-10-30 LAB — POC HEMOGLOBIN A1C: 5.7 % (ref 4.2–6.5)

## 2023-10-30 PROCEDURE — 3079F DIAST BP 80-89 MM HG: CPT | Performed by: FAMILY MEDICINE

## 2023-10-30 PROCEDURE — 99214 OFFICE O/P EST MOD 30 MIN: CPT | Performed by: FAMILY MEDICINE

## 2023-10-30 PROCEDURE — G0008 ADMIN INFLUENZA VIRUS VAC: HCPCS | Performed by: FAMILY MEDICINE

## 2023-10-30 PROCEDURE — 3075F SYST BP GE 130 - 139MM HG: CPT | Performed by: FAMILY MEDICINE

## 2023-10-30 PROCEDURE — 4010F ACE/ARB THERAPY RXD/TAKEN: CPT | Performed by: FAMILY MEDICINE

## 2023-10-30 PROCEDURE — 83036 HEMOGLOBIN GLYCOSYLATED A1C: CPT | Performed by: FAMILY MEDICINE

## 2023-10-30 PROCEDURE — 90686 IIV4 VACC NO PRSV 0.5 ML IM: CPT | Performed by: FAMILY MEDICINE

## 2023-10-30 PROCEDURE — 1036F TOBACCO NON-USER: CPT | Performed by: FAMILY MEDICINE

## 2023-10-30 RX ORDER — METFORMIN HYDROCHLORIDE 500 MG/1
500 TABLET ORAL 2 TIMES DAILY
Qty: 180 TABLET | Refills: 1 | Status: SHIPPED | OUTPATIENT
Start: 2023-10-30 | End: 2024-05-28

## 2023-10-30 RX ORDER — SEMAGLUTIDE 2.68 MG/ML
2 INJECTION, SOLUTION SUBCUTANEOUS
Qty: 9 ML | Refills: 3 | Status: SHIPPED | OUTPATIENT
Start: 2023-10-30 | End: 2023-12-01 | Stop reason: SDUPTHER

## 2023-10-31 ENCOUNTER — APPOINTMENT (OUTPATIENT)
Dept: PRIMARY CARE | Facility: CLINIC | Age: 63
End: 2023-10-31
Payer: MEDICARE

## 2023-11-05 PROBLEM — Z00.00 ROUTINE GENERAL MEDICAL EXAMINATION AT A HEALTH CARE FACILITY: Status: ACTIVE | Noted: 2023-11-05

## 2023-11-05 PROBLEM — Z23 INFLUENZA VACCINE ADMINISTERED: Status: ACTIVE | Noted: 2023-11-05

## 2023-11-05 NOTE — PROGRESS NOTES
"  Subjective     Patient ID: Twyla Mendoza is a 62 y.o. female who presents for Follow-up.      Last Physical :  1 Years ago     Pt's PMH, PSH, SH, FH , meds and allergies was obtained / reviewed and updated .     Dental visits : Y  Vision issues : N  Hearing issues : N    Immunizations : Y    Diet :  could be better  Exercise:  Weight concerns :     Alcohol: as noted in SH  Tobacco: as noted in SH  Recreational drug use : None/ as noted in SH    Sexually active : Active   Contraception :   Menstrual problems:  Premenopausal/perimenopausal/ postmenopausal:    G:  Parity:  Full term:    Premature:   (s):   Living :  Ab induced:   Ab spontaneous :  Ectopic :   Multiple :    PAP smear :  Mammogram :  Colonoscopy:    Metabolic screening   - Lipid   - Glucose    DM controlled  Has lost weight  Eating better  BP controlled  NO CP SOB    ======================================================    Visit Vitals  Blood Pressure 138/82   Pulse 88   Height 1.651 m (5' 5\")   Weight 99.3 kg (219 lb)   Body Mass Index 36.44 kg/m²   Smoking Status Never   Body Surface Area 2.13 m²      No LMP recorded.     =====================================    Review of systems:  Constitutional: no chills, no fever and no night sweats.     Eyes: no blurred vision and no eyesight problems.     ENT: no hearing loss, no nasal congestion, no nasal discharge, no hoarseness and no sore throat.     Cardiovascular: no chest pain, no intermittent leg claudication, no lower extremity edema, no palpitations and no syncope.     Respiratory: no cough, no shortness of breath during exertion, no shortness of breath at rest and no wheezing.     Gastrointestinal: no abdominal pain, no blood in stools, no constipation, no diarrhea, no melena, no nausea, no rectal pain and no vomiting.     Genitourinary: no dysuria, no change in urinary frequency, no urinary hesitancy, no feelings of urinary urgency and no vaginal discharge.     Musculoskeletal: no " arthralgias, no back pain and no myalgias.     Integumentary: no new skin lesions and no rashes.     Neurological: no difficulty walking, no headache, no limb weakness, no numbness and no tingling.     Psychiatric: no anxiety, no depression, no anhedonia and no substance use disorders.     Endocrine: no recent weight gain and no recent weight loss.     Hematologic/Lymphatic: no tendency for easy bruising and no swollen glands.   ============================================================    Physical exam :    Constitutional: Alert and in no acute distress. Well developed, well nourished.     Eyes: Normal external exam. Pupils were equal in size, round, reactive to light (PERRL) with normal accommodation and extraocular movements intact (EOMI).     Ears, Nose, Mouth, and Throat: External inspection of ears and nose: Normal.  Otoscopic examination: Normal.      Neck: No neck mass was observed. Supple.     Cardiovascular: Heart rate and rhythm were normal, normal S1 and S2, no gallops, no murmurs and no pericardial rub    Pulmonary: No respiratory distress. Clear bilateral breath sounds.     Abdomen: Soft nontender; no abdominal mass palpated. No organomegaly.     Musculoskeletal: No joint swelling seen, normal movements of all extremities. Range of motion: Normal.  Muscle strength/tone: Normal.      Skin: Normal skin color and pigmentation, normal skin turgor, and no rash.     Neurologic: Deep tendon reflexes were 2+ and symmetric. Sensation: Normal.     Psychiatric: Judgment and insight: Intact. Mood and affect: Normal.    Lymphatic : Cervical/ axillary/ groin Lns Palpable/ non palpable            All other systems have been reviewed and are negative for complaint.      Assessment/Plan    Problem List Items Addressed This Visit             ICD-10-CM    Chronic kidney disease (CKD) stage G3a/A1, moderately decreased glomerular filtration rate (GFR) between 45-59 mL/min/1.73 square meter and albuminuria creatinine  ratio less than 30 mg/g (CMS/HCC) N18.31    Class 2 severe obesity with body mass index (BMI) of 35 to 39.9 with serious comorbidity (CMS/HCC) E66.01    Diabetes mellitus type 2, uncomplicated (CMS/HCC) E11.9    Relevant Medications    metFORMIN (Glucophage) 500 mg tablet    semaglutide (Ozempic) 2 mg/dose (8 mg/3 mL) pen injector    Other Relevant Orders    POCT glycosylated hemoglobin (Hb A1C) manually resulted (Completed)    Dyslipidemia E78.5    HTN (hypertension) I10    Influenza vaccine administered Z23    Relevant Orders    Flu vaccine (IIV4) age 6 months and greater, preservative free (Completed)    Routine general medical examination at a health care facility - Primary Z00.00

## 2023-11-30 DIAGNOSIS — I10 PRIMARY HYPERTENSION: Primary | ICD-10-CM

## 2023-11-30 RX ORDER — METOPROLOL SUCCINATE 50 MG/1
50 TABLET, EXTENDED RELEASE ORAL DAILY
Qty: 90 TABLET | Refills: 0 | Status: SHIPPED | OUTPATIENT
Start: 2023-11-30

## 2023-12-01 DIAGNOSIS — E11.9 TYPE 2 DIABETES MELLITUS WITHOUT COMPLICATION, WITHOUT LONG-TERM CURRENT USE OF INSULIN (MULTI): ICD-10-CM

## 2023-12-01 RX ORDER — SEMAGLUTIDE 2.68 MG/ML
2 INJECTION, SOLUTION SUBCUTANEOUS
Qty: 9 ML | Refills: 3 | Status: SHIPPED | OUTPATIENT
Start: 2023-12-01

## 2023-12-05 ENCOUNTER — TELEPHONE (OUTPATIENT)
Dept: PRIMARY CARE | Facility: CLINIC | Age: 63
End: 2023-12-05
Payer: MEDICARE

## 2023-12-05 DIAGNOSIS — E11.9 TYPE 2 DIABETES MELLITUS WITHOUT COMPLICATION, WITHOUT LONG-TERM CURRENT USE OF INSULIN (MULTI): Primary | ICD-10-CM

## 2023-12-05 NOTE — TELEPHONE ENCOUNTER
Ozempic dose increased to 2mg. It is $787  pt can not afford that. She was only paying $40 for lower dose.    Please change medication

## 2024-01-30 ENCOUNTER — OFFICE VISIT (OUTPATIENT)
Dept: PRIMARY CARE | Facility: CLINIC | Age: 64
End: 2024-01-30
Payer: MEDICARE

## 2024-01-30 VITALS
SYSTOLIC BLOOD PRESSURE: 138 MMHG | TEMPERATURE: 98 F | HEIGHT: 65 IN | WEIGHT: 218 LBS | HEART RATE: 81 BPM | DIASTOLIC BLOOD PRESSURE: 88 MMHG | BODY MASS INDEX: 36.32 KG/M2

## 2024-01-30 DIAGNOSIS — J06.9 ACUTE UPPER RESPIRATORY INFECTION, UNSPECIFIED: ICD-10-CM

## 2024-01-30 DIAGNOSIS — J01.00 ACUTE NON-RECURRENT MAXILLARY SINUSITIS: Primary | ICD-10-CM

## 2024-01-30 DIAGNOSIS — U07.1 COVID-19: ICD-10-CM

## 2024-01-30 PROCEDURE — 87636 SARSCOV2 & INF A&B AMP PRB: CPT

## 2024-01-30 PROCEDURE — 3075F SYST BP GE 130 - 139MM HG: CPT | Performed by: FAMILY MEDICINE

## 2024-01-30 PROCEDURE — 4010F ACE/ARB THERAPY RXD/TAKEN: CPT | Performed by: FAMILY MEDICINE

## 2024-01-30 PROCEDURE — 3079F DIAST BP 80-89 MM HG: CPT | Performed by: FAMILY MEDICINE

## 2024-01-30 PROCEDURE — 1036F TOBACCO NON-USER: CPT | Performed by: FAMILY MEDICINE

## 2024-01-30 PROCEDURE — 99214 OFFICE O/P EST MOD 30 MIN: CPT | Performed by: FAMILY MEDICINE

## 2024-01-30 RX ORDER — FLUTICASONE PROPIONATE 50 MCG
2 SPRAY, SUSPENSION (ML) NASAL DAILY
Qty: 16 G | Refills: 2 | Status: SHIPPED | OUTPATIENT
Start: 2024-01-30

## 2024-01-30 RX ORDER — METHYLPREDNISOLONE 4 MG/1
TABLET ORAL
Qty: 21 TABLET | Refills: 0 | Status: SHIPPED | OUTPATIENT
Start: 2024-01-30 | End: 2024-02-06

## 2024-01-30 RX ORDER — AMOXICILLIN AND CLAVULANATE POTASSIUM 875; 125 MG/1; MG/1
875 TABLET, FILM COATED ORAL 2 TIMES DAILY
Qty: 14 TABLET | Refills: 0 | Status: SHIPPED | OUTPATIENT
Start: 2024-01-30 | End: 2024-02-06

## 2024-01-30 ASSESSMENT — PATIENT HEALTH QUESTIONNAIRE - PHQ9
SUM OF ALL RESPONSES TO PHQ9 QUESTIONS 1 AND 2: 0
1. LITTLE INTEREST OR PLEASURE IN DOING THINGS: NOT AT ALL
2. FEELING DOWN, DEPRESSED OR HOPELESS: NOT AT ALL

## 2024-01-30 ASSESSMENT — ENCOUNTER SYMPTOMS
DEPRESSION: 0
LOSS OF SENSATION IN FEET: 0
OCCASIONAL FEELINGS OF UNSTEADINESS: 0

## 2024-01-31 DIAGNOSIS — U07.1 COVID-19: Primary | ICD-10-CM

## 2024-01-31 LAB
FLUAV RNA RESP QL NAA+PROBE: NOT DETECTED
FLUBV RNA RESP QL NAA+PROBE: NOT DETECTED
SARS-COV-2 RNA RESP QL NAA+PROBE: DETECTED

## 2024-01-31 RX ORDER — NIRMATRELVIR AND RITONAVIR 300-100 MG
3 KIT ORAL 2 TIMES DAILY
Qty: 30 TABLET | Refills: 0 | Status: SHIPPED | OUTPATIENT
Start: 2024-01-31 | End: 2024-02-05

## 2024-02-03 PROBLEM — J06.9 ACUTE UPPER RESPIRATORY INFECTION, UNSPECIFIED: Status: ACTIVE | Noted: 2024-02-03

## 2024-02-03 PROBLEM — U07.1 COVID-19: Status: ACTIVE | Noted: 2024-02-03

## 2024-02-03 PROBLEM — J01.00 ACUTE NON-RECURRENT MAXILLARY SINUSITIS: Status: ACTIVE | Noted: 2024-02-03

## 2024-02-03 ASSESSMENT — ENCOUNTER SYMPTOMS
ARTHRALGIAS: 1
SINUS PAIN: 1
SINUS COMPLAINT: 1
CARDIOVASCULAR NEGATIVE: 1
FATIGUE: 1
SINUS PRESSURE: 1
DIZZINESS: 1
NAUSEA: 1
CHILLS: 1
SHORTNESS OF BREATH: 0
HEADACHES: 1
EYES NEGATIVE: 1
COUGH: 1

## 2024-02-03 NOTE — PROGRESS NOTES
Subjective   Patient ID: Twyla Mendoza is a 63 y.o. female who presents for Sinus Problem, Dizziness, Nasal Congestion, and Headache.      Sinus Problem  Associated symptoms include chills, congestion, coughing, ear pain, headaches and sinus pressure. Pertinent negatives include no shortness of breath.   Dizziness  Associated symptoms include arthralgias, chills, congestion, coughing, fatigue, headaches and nausea.   Headache   Associated symptoms include coughing, dizziness, ear pain, nausea and sinus pressure.      c/o headache fever facial pain cough ear pain cough green phlem.  ST .     Current Outpatient Medications on File Prior to Visit   Medication Sig Dispense Refill    amLODIPine (Norvasc) 10 mg tablet Take 1 tablet (10 mg) by mouth once daily.      ascorbic acid, vitamin C, 500 mg capsule Take 1 tablet by mouth once daily.      aspirin 81 mg EC tablet Take by mouth.      chlorthalidone (Hygroton) 25 mg tablet Take 1 tablet (25 mg) by mouth once daily.      cholecalciferol (Vitamin D-3) 5,000 Units tablet Take 1 tablet (5,000 Units) by mouth once daily.      magnesium gluconate (Magonate) 27.5 mg magne- sium (500 mg) tablet Take 1 tablet (27.5 mg) by mouth once daily.      melatonin 10 mg tablet Take 1 tablet (10 mg) by mouth once daily at bedtime.      metFORMIN (Glucophage) 500 mg tablet Take 1 tablet (500 mg) by mouth 2 times a day. 180 tablet 1    metoprolol succinate XL (Toprol-XL) 50 mg 24 hr tablet TAKE ONE TABLET BY MOUTH DAILY 90 tablet 0    multivit-min-mfolate-K-cudh794 (Alive Women's 50 Plus Ultra) 800 mcg DFE- 150 mcg tablet Take 1 tablet by mouth once daily.      olmesartan (BENIcar) 40 mg tablet Take 1 tablet (40 mg) by mouth once daily.      OneTouch Ultra Test strip twice a day.      polyethylene glycol-electrolytes (polyethylene glycol) 420 gram solution Take by mouth.      psyllium husk, aspartame, (Metamucil Sugar-Free, aspart,) 3.4 gram/5.8 gram powder Take by mouth twice a day.    "   rosuvastatin (Crestor) 10 mg tablet Take 1 tablet (10 mg) by mouth once daily. 90 tablet 1    semaglutide (Ozempic) 2 mg/dose (8 mg/3 mL) pen injector Inject 2 mg under the skin every 7 days. 9 mL 3    solifenacin (VESIcare) 10 mg tablet Take 1 tablet (10 mg) by mouth once daily at bedtime.      triamcinolone (Kenalog) 0.1 % cream apply and rub in a thin flim to affected areas twice daily (am and pm)      [DISCONTINUED] fluticasone (Flonase) 50 mcg/actuation nasal spray Administer 2 sprays into each nostril once daily.      [DISCONTINUED] semaglutide (OZEMPIC) 1 mg/dose (4 mg/3 mL) pen injector Inject 1 mg under the skin 1 (one) time per week. 2 mL 3     No current facility-administered medications on file prior to visit.        Review of Systems   Constitutional:  Positive for chills and fatigue.   HENT:  Positive for congestion, ear pain, postnasal drip, sinus pressure and sinus pain.    Eyes: Negative.    Respiratory:  Positive for cough. Negative for shortness of breath.    Cardiovascular: Negative.    Gastrointestinal:  Positive for nausea.   Musculoskeletal:  Positive for arthralgias.   Neurological:  Positive for dizziness and headaches.       Objective   Blood Pressure 138/88   Pulse 81   Temperature 36.7 °C (98 °F)   Height 1.651 m (5' 5\")   Weight 98.9 kg (218 lb)   Body Mass Index 36.28 kg/m²   BSA: 2.13 meters squared  Growth percentiles: Facility age limit for growth %lara is 20 years. Facility age limit for growth %lara is 20 years.   Office Visit on 01/30/2024   Component Date Value Ref Range Status    Flu A Result 01/30/2024 Not Detected  Not Detected Final    Flu B Result 01/30/2024 Not Detected  Not Detected Final    Coronavirus 2019, PCR 01/30/2024 Detected (A)  Not Detected Final      Physical Exam  Constitutional:       General: She is not in acute distress.     Appearance: She is ill-appearing.   HENT:      Nose: Congestion present.   Eyes:      Extraocular Movements: Extraocular " movements intact.   Cardiovascular:      Rate and Rhythm: Normal rate and regular rhythm.   Pulmonary:      Effort: Pulmonary effort is normal.      Breath sounds: Normal breath sounds.   Abdominal:      General: Bowel sounds are normal.   Musculoskeletal:         General: Normal range of motion.      Cervical back: No rigidity.   Skin:     Findings: No rash.   Neurological:      General: No focal deficit present.      Mental Status: She is alert.   Psychiatric:         Thought Content: Thought content normal.         Assessment/Plan   Problem List Items Addressed This Visit             ICD-10-CM    Acute upper respiratory infection, unspecified J06.9    Relevant Orders    Sars-CoV-2 and Influenza A/B PCR (Completed)    Acute non-recurrent maxillary sinusitis - Primary J01.00    Relevant Medications    amoxicillin-pot clavulanate (Augmentin) 875-125 mg tablet    methylPREDNISolone (Medrol Dospak) 4 mg tablets    fluticasone (Flonase) 50 mcg/actuation nasal spray    Other Relevant Orders    Sars-CoV-2 and Influenza A/B PCR (Completed)    COVID-19 U07.1     Paxlovid started

## 2024-02-07 DIAGNOSIS — N32.81 OAB (OVERACTIVE BLADDER): Primary | ICD-10-CM

## 2024-02-08 RX ORDER — SOLIFENACIN SUCCINATE 10 MG/1
10 TABLET, FILM COATED ORAL NIGHTLY
Qty: 30 TABLET | Refills: 0 | Status: SHIPPED | OUTPATIENT
Start: 2024-02-08 | End: 2024-02-12 | Stop reason: SDUPTHER

## 2024-02-12 DIAGNOSIS — N32.81 OAB (OVERACTIVE BLADDER): ICD-10-CM

## 2024-02-12 DIAGNOSIS — R23.3 PETECHIAE: ICD-10-CM

## 2024-02-12 RX ORDER — SOLIFENACIN SUCCINATE 10 MG/1
10 TABLET, FILM COATED ORAL NIGHTLY
Qty: 90 TABLET | Refills: 1 | Status: SHIPPED | OUTPATIENT
Start: 2024-02-12

## 2024-02-12 RX ORDER — FLUOCINONIDE 0.5 MG/G
OINTMENT TOPICAL 2 TIMES DAILY
Qty: 60 G | Refills: 1 | Status: SHIPPED | OUTPATIENT
Start: 2024-02-12 | End: 2025-02-11

## 2024-04-23 ENCOUNTER — LAB (OUTPATIENT)
Dept: LAB | Facility: LAB | Age: 64
End: 2024-04-23
Payer: MEDICARE

## 2024-04-23 ENCOUNTER — OFFICE VISIT (OUTPATIENT)
Dept: PRIMARY CARE | Facility: CLINIC | Age: 64
End: 2024-04-23
Payer: MEDICARE

## 2024-04-23 VITALS
TEMPERATURE: 98.1 F | WEIGHT: 220 LBS | DIASTOLIC BLOOD PRESSURE: 73 MMHG | HEIGHT: 65 IN | HEART RATE: 91 BPM | BODY MASS INDEX: 36.65 KG/M2 | SYSTOLIC BLOOD PRESSURE: 108 MMHG | RESPIRATION RATE: 18 BRPM | OXYGEN SATURATION: 96 %

## 2024-04-23 DIAGNOSIS — E66.01 CLASS 2 SEVERE OBESITY WITH BODY MASS INDEX (BMI) OF 35 TO 39.9 WITH SERIOUS COMORBIDITY (MULTI): ICD-10-CM

## 2024-04-23 DIAGNOSIS — I10 PRIMARY HYPERTENSION: ICD-10-CM

## 2024-04-23 DIAGNOSIS — E11.9 TYPE 2 DIABETES MELLITUS WITHOUT COMPLICATION, WITHOUT LONG-TERM CURRENT USE OF INSULIN (MULTI): ICD-10-CM

## 2024-04-23 DIAGNOSIS — Z00.00 ROUTINE GENERAL MEDICAL EXAMINATION AT HEALTH CARE FACILITY: Primary | ICD-10-CM

## 2024-04-23 DIAGNOSIS — N18.31 CHRONIC KIDNEY DISEASE (CKD) STAGE G3A/A1, MODERATELY DECREASED GLOMERULAR FILTRATION RATE (GFR) BETWEEN 45-59 ML/MIN/1.73 SQUARE METER AND ALBUMINURIA CREATININE RATIO LESS THAN 30 MG/G (CMS* (MULTI): ICD-10-CM

## 2024-04-23 DIAGNOSIS — F32.0 DEPRESSION, MAJOR, SINGLE EPISODE, MILD (CMS-HCC): ICD-10-CM

## 2024-04-23 DIAGNOSIS — R73.03 PREDIABETES: ICD-10-CM

## 2024-04-23 DIAGNOSIS — E78.5 DYSLIPIDEMIA: ICD-10-CM

## 2024-04-23 LAB
ALBUMIN SERPL BCP-MCNC: 4.5 G/DL (ref 3.4–5)
ALP SERPL-CCNC: 56 U/L (ref 33–136)
ALT SERPL W P-5'-P-CCNC: 17 U/L (ref 7–45)
ANION GAP SERPL CALC-SCNC: 13 MMOL/L (ref 10–20)
AST SERPL W P-5'-P-CCNC: 15 U/L (ref 9–39)
BILIRUB SERPL-MCNC: 0.5 MG/DL (ref 0–1.2)
BUN SERPL-MCNC: 15 MG/DL (ref 6–23)
CALCIUM SERPL-MCNC: 9.6 MG/DL (ref 8.6–10.6)
CHLORIDE SERPL-SCNC: 103 MMOL/L (ref 98–107)
CHOLEST SERPL-MCNC: 122 MG/DL (ref 0–199)
CHOLESTEROL/HDL RATIO: 3.7
CO2 SERPL-SCNC: 29 MMOL/L (ref 21–32)
CREAT SERPL-MCNC: 1.17 MG/DL (ref 0.5–1.05)
CREAT UR-MCNC: 373.9 MG/DL (ref 20–320)
EGFRCR SERPLBLD CKD-EPI 2021: 52 ML/MIN/1.73M*2
ERYTHROCYTE [DISTWIDTH] IN BLOOD BY AUTOMATED COUNT: 14.2 % (ref 11.5–14.5)
GLUCOSE SERPL-MCNC: 83 MG/DL (ref 74–99)
HCT VFR BLD AUTO: 45.2 % (ref 36–46)
HDLC SERPL-MCNC: 33.3 MG/DL
HGB BLD-MCNC: 15 G/DL (ref 12–16)
LDLC SERPL CALC-MCNC: 62 MG/DL
MCH RBC QN AUTO: 29.6 PG (ref 26–34)
MCHC RBC AUTO-ENTMCNC: 33.2 G/DL (ref 32–36)
MCV RBC AUTO: 89 FL (ref 80–100)
MICROALBUMIN UR-MCNC: 149.6 MG/L
MICROALBUMIN/CREAT UR: 40 UG/MG CREAT
NON HDL CHOLESTEROL: 89 MG/DL (ref 0–149)
NRBC BLD-RTO: 0 /100 WBCS (ref 0–0)
PLATELET # BLD AUTO: 296 X10*3/UL (ref 150–450)
POC HEMOGLOBIN A1C: 5.8 % (ref 4.2–6.5)
POTASSIUM SERPL-SCNC: 4 MMOL/L (ref 3.5–5.3)
PROT SERPL-MCNC: 7.5 G/DL (ref 6.4–8.2)
RBC # BLD AUTO: 5.07 X10*6/UL (ref 4–5.2)
SODIUM SERPL-SCNC: 141 MMOL/L (ref 136–145)
TRIGL SERPL-MCNC: 133 MG/DL (ref 0–149)
TSH SERPL-ACNC: 1.58 MIU/L (ref 0.44–3.98)
VLDL: 27 MG/DL (ref 0–40)
WBC # BLD AUTO: 5.2 X10*3/UL (ref 4.4–11.3)

## 2024-04-23 PROCEDURE — 80053 COMPREHEN METABOLIC PANEL: CPT

## 2024-04-23 PROCEDURE — 85027 COMPLETE CBC AUTOMATED: CPT

## 2024-04-23 PROCEDURE — 3074F SYST BP LT 130 MM HG: CPT | Performed by: FAMILY MEDICINE

## 2024-04-23 PROCEDURE — 4010F ACE/ARB THERAPY RXD/TAKEN: CPT | Performed by: FAMILY MEDICINE

## 2024-04-23 PROCEDURE — 3060F POS MICROALBUMINURIA REV: CPT | Performed by: FAMILY MEDICINE

## 2024-04-23 PROCEDURE — 3078F DIAST BP <80 MM HG: CPT | Performed by: FAMILY MEDICINE

## 2024-04-23 PROCEDURE — G0447 BEHAVIOR COUNSEL OBESITY 15M: HCPCS | Performed by: FAMILY MEDICINE

## 2024-04-23 PROCEDURE — 80061 LIPID PANEL: CPT

## 2024-04-23 PROCEDURE — 82043 UR ALBUMIN QUANTITATIVE: CPT

## 2024-04-23 PROCEDURE — 99214 OFFICE O/P EST MOD 30 MIN: CPT | Performed by: FAMILY MEDICINE

## 2024-04-23 PROCEDURE — G0439 PPPS, SUBSEQ VISIT: HCPCS | Performed by: FAMILY MEDICINE

## 2024-04-23 PROCEDURE — 3048F LDL-C <100 MG/DL: CPT | Performed by: FAMILY MEDICINE

## 2024-04-23 PROCEDURE — 82570 ASSAY OF URINE CREATININE: CPT

## 2024-04-23 PROCEDURE — 84443 ASSAY THYROID STIM HORMONE: CPT

## 2024-04-23 PROCEDURE — 83036 HEMOGLOBIN GLYCOSYLATED A1C: CPT | Performed by: FAMILY MEDICINE

## 2024-04-23 PROCEDURE — 36415 COLL VENOUS BLD VENIPUNCTURE: CPT

## 2024-04-23 ASSESSMENT — ACTIVITIES OF DAILY LIVING (ADL)
GROCERY_SHOPPING: INDEPENDENT
MANAGING_FINANCES: INDEPENDENT
DRESSING: INDEPENDENT
TAKING_MEDICATION: INDEPENDENT
DOING_HOUSEWORK: INDEPENDENT
BATHING: INDEPENDENT

## 2024-04-23 NOTE — PROGRESS NOTES
"Subjective   Reason for Visit: Twyla Mendoza is an 63 y.o. female here for a Medicare Wellness visit.     Past Medical, Surgical, and Family History reviewed and updated in chart.         HPI  Patient is here for follow-up of diabetes hypertension hyperlipidemia taking meds as prescribed tolerating statin is taking Ozempic and metformin has lost weight feels better  Patient Care Team:  Adriel Hurtado MD as PCP - General  Adriel Hurtado MD as PCP - [employer] PCP     Review of Systems   Constitutional:  Negative for chills and fever.   HENT: Negative.     Respiratory: Negative.     Cardiovascular: Negative.    Gastrointestinal: Negative.  Negative for nausea and vomiting.   Endocrine: Negative.    Genitourinary: Negative.    Musculoskeletal:  Positive for arthralgias and back pain.   Skin: Negative.  Negative for rash.   Allergic/Immunologic: Negative.    Neurological: Negative.    Hematological: Negative.    Psychiatric/Behavioral: Negative.     All other systems reviewed and are negative.      Objective   Vitals:  /73   Pulse 91   Temp 36.7 °C (98.1 °F)   Resp 18   Ht 1.651 m (5' 5\")   Wt 99.8 kg (220 lb)   SpO2 96%   BMI 36.61 kg/m²       Physical Exam  Constitutional:       Appearance: Normal appearance.   Cardiovascular:      Rate and Rhythm: Normal rate and regular rhythm.   Pulmonary:      Effort: Pulmonary effort is normal.      Breath sounds: Normal breath sounds.   Abdominal:      General: Bowel sounds are normal.   Neurological:      General: No focal deficit present.      Mental Status: She is alert.   Psychiatric:         Mood and Affect: Mood normal.         Assessment/Plan   Problem List Items Addressed This Visit       Chronic kidney disease (CKD) stage G3a/A1, moderately decreased glomerular filtration rate (GFR) between 45-59 mL/min/1.73 square meter and albuminuria creatinine ratio less than 30 mg/g (CMS* (Multi)    Current Assessment & Plan     Avoid nephrotoxic " agents monitor renal function         Relevant Orders    Lipid Panel (Completed)    CBC (Completed)    TSH with reflex to Free T4 if abnormal (Completed)    Comprehensive Metabolic Panel (Completed)    Albumin , Urine Random (Completed)    Class 2 severe obesity with body mass index (BMI) of 35 to 39.9 with serious comorbidity (Multi)    Relevant Orders    Lipid Panel (Completed)    CBC (Completed)    TSH with reflex to Free T4 if abnormal (Completed)    Comprehensive Metabolic Panel (Completed)    Albumin , Urine Random (Completed)    Depression, major, single episode, mild (CMS-[facility])    Current Assessment & Plan     Has been off medication and is doing well         Diabetes mellitus type 2, uncomplicated (Multi)    Relevant Orders    Lipid Panel (Completed)    CBC (Completed)    TSH with reflex to Free T4 if abnormal (Completed)    Comprehensive Metabolic Panel (Completed)    Albumin , Urine Random (Completed)    Dyslipidemia    Current Assessment & Plan     Continue rosuvastatin         HTN (hypertension)    Current Assessment & Plan     Blood pressure controlled continue losartan         Relevant Orders    Lipid Panel (Completed)    CBC (Completed)    TSH with reflex to Free T4 if abnormal (Completed)    Comprehensive Metabolic Panel (Completed)    Albumin , Urine Random (Completed)    Routine general medical examination at health care facility - Primary    Prediabetes    Relevant Orders    POCT glycosylated hemoglobin (Hb A1C) manually resulted (Completed)    Lipid Panel (Completed)    CBC (Completed)    TSH with reflex to Free T4 if abnormal (Completed)    Comprehensive Metabolic Panel (Completed)    Albumin , Urine Random (Completed)   patient is advised to lose weight by reducing calorie intake and increasing activity levels, especially aerobic exercise 15 minutes spent on obesity counseling        Male Completion Statement: After discussing his treatment course we decided to discontinue isotretinoin therapy at this time. He shouldn't donate blood for one month after the last dose. He should call with any new symptoms of depression.

## 2024-04-29 PROBLEM — R73.03 PREDIABETES: Status: ACTIVE | Noted: 2024-04-29

## 2024-04-29 PROBLEM — Z00.00 ROUTINE GENERAL MEDICAL EXAMINATION AT HEALTH CARE FACILITY: Status: ACTIVE | Noted: 2024-04-29

## 2024-04-29 ASSESSMENT — ENCOUNTER SYMPTOMS
RESPIRATORY NEGATIVE: 1
FEVER: 0
HEMATOLOGIC/LYMPHATIC NEGATIVE: 1
ARTHRALGIAS: 1
ALLERGIC/IMMUNOLOGIC NEGATIVE: 1
GASTROINTESTINAL NEGATIVE: 1
ENDOCRINE NEGATIVE: 1
PSYCHIATRIC NEGATIVE: 1
NEUROLOGICAL NEGATIVE: 1
CARDIOVASCULAR NEGATIVE: 1
VOMITING: 0
BACK PAIN: 1
NAUSEA: 0
CHILLS: 0

## 2024-05-25 DIAGNOSIS — E11.9 TYPE 2 DIABETES MELLITUS WITHOUT COMPLICATION, UNSPECIFIED WHETHER LONG TERM INSULIN USE (MULTI): ICD-10-CM

## 2024-05-28 RX ORDER — METFORMIN HYDROCHLORIDE 500 MG/1
500 TABLET ORAL 2 TIMES DAILY
Qty: 180 TABLET | Refills: 1 | Status: SHIPPED | OUTPATIENT
Start: 2024-05-28

## 2024-05-28 NOTE — TELEPHONE ENCOUNTER
Next OV 07/24   Requested Prescriptions     Pending Prescriptions Disp Refills    metFORMIN (Glucophage) 500 mg tablet [Pharmacy Med Name: metFORMIN HCl Oral Tablet 500 MG] 180 tablet 0     Sig: TAKE ONE TABLET BY MOUTH TWO TIMES A DAY

## 2024-07-15 DIAGNOSIS — E78.5 DYSLIPIDEMIA: ICD-10-CM

## 2024-07-15 RX ORDER — ROSUVASTATIN CALCIUM 10 MG/1
10 TABLET, COATED ORAL DAILY
Qty: 90 TABLET | Refills: 0 | Status: SHIPPED | OUTPATIENT
Start: 2024-07-15 | End: 2024-07-16 | Stop reason: SDUPTHER

## 2024-07-16 DIAGNOSIS — E78.5 DYSLIPIDEMIA: ICD-10-CM

## 2024-07-16 RX ORDER — ROSUVASTATIN CALCIUM 10 MG/1
10 TABLET, COATED ORAL DAILY
Qty: 90 TABLET | Refills: 0 | Status: SHIPPED | OUTPATIENT
Start: 2024-07-16

## 2024-07-30 ENCOUNTER — APPOINTMENT (OUTPATIENT)
Dept: PRIMARY CARE | Facility: CLINIC | Age: 64
End: 2024-07-30

## 2024-07-30 VITALS
BODY MASS INDEX: 34.26 KG/M2 | DIASTOLIC BLOOD PRESSURE: 85 MMHG | HEIGHT: 65 IN | WEIGHT: 205.6 LBS | HEART RATE: 86 BPM | SYSTOLIC BLOOD PRESSURE: 135 MMHG | OXYGEN SATURATION: 95 % | RESPIRATION RATE: 20 BRPM

## 2024-07-30 DIAGNOSIS — N32.81 OAB (OVERACTIVE BLADDER): ICD-10-CM

## 2024-07-30 DIAGNOSIS — E11.9 TYPE 2 DIABETES MELLITUS WITHOUT COMPLICATION, UNSPECIFIED WHETHER LONG TERM INSULIN USE (MULTI): ICD-10-CM

## 2024-07-30 DIAGNOSIS — J01.00 ACUTE NON-RECURRENT MAXILLARY SINUSITIS: ICD-10-CM

## 2024-07-30 DIAGNOSIS — R23.3 PETECHIAE: ICD-10-CM

## 2024-07-30 DIAGNOSIS — E78.5 DYSLIPIDEMIA: ICD-10-CM

## 2024-07-30 DIAGNOSIS — Z12.31 VISIT FOR SCREENING MAMMOGRAM: Primary | ICD-10-CM

## 2024-07-30 DIAGNOSIS — I10 PRIMARY HYPERTENSION: ICD-10-CM

## 2024-07-30 LAB — POC HEMOGLOBIN A1C: 5.6 % (ref 4.2–6.5)

## 2024-07-30 PROCEDURE — 3048F LDL-C <100 MG/DL: CPT | Performed by: FAMILY MEDICINE

## 2024-07-30 PROCEDURE — 3060F POS MICROALBUMINURIA REV: CPT | Performed by: FAMILY MEDICINE

## 2024-07-30 PROCEDURE — 3075F SYST BP GE 130 - 139MM HG: CPT | Performed by: FAMILY MEDICINE

## 2024-07-30 PROCEDURE — 3079F DIAST BP 80-89 MM HG: CPT | Performed by: FAMILY MEDICINE

## 2024-07-30 PROCEDURE — G2211 COMPLEX E/M VISIT ADD ON: HCPCS | Performed by: FAMILY MEDICINE

## 2024-07-30 PROCEDURE — 83036 HEMOGLOBIN GLYCOSYLATED A1C: CPT | Performed by: FAMILY MEDICINE

## 2024-07-30 PROCEDURE — 3008F BODY MASS INDEX DOCD: CPT | Performed by: FAMILY MEDICINE

## 2024-07-30 PROCEDURE — 4010F ACE/ARB THERAPY RXD/TAKEN: CPT | Performed by: FAMILY MEDICINE

## 2024-07-30 PROCEDURE — 99214 OFFICE O/P EST MOD 30 MIN: CPT | Performed by: FAMILY MEDICINE

## 2024-07-30 RX ORDER — FLUTICASONE PROPIONATE 50 MCG
2 SPRAY, SUSPENSION (ML) NASAL DAILY
Qty: 16 G | Refills: 2 | Status: SHIPPED | OUTPATIENT
Start: 2024-07-30

## 2024-07-30 RX ORDER — SOLIFENACIN SUCCINATE 10 MG/1
10 TABLET, FILM COATED ORAL NIGHTLY
Qty: 90 TABLET | Refills: 1 | Status: SHIPPED | OUTPATIENT
Start: 2024-07-30

## 2024-07-30 RX ORDER — METFORMIN HYDROCHLORIDE 500 MG/1
500 TABLET ORAL 2 TIMES DAILY
Qty: 180 TABLET | Refills: 1 | Status: SHIPPED | OUTPATIENT
Start: 2024-07-30

## 2024-07-30 RX ORDER — OLMESARTAN MEDOXOMIL 40 MG/1
40 TABLET ORAL DAILY
Qty: 90 TABLET | Refills: 3 | Status: SHIPPED | OUTPATIENT
Start: 2024-07-30 | End: 2025-07-30

## 2024-07-30 RX ORDER — ROSUVASTATIN CALCIUM 10 MG/1
10 TABLET, COATED ORAL DAILY
Qty: 90 TABLET | Refills: 0 | Status: SHIPPED | OUTPATIENT
Start: 2024-07-30

## 2024-07-30 RX ORDER — TRIAMCINOLONE ACETONIDE 1 MG/G
CREAM TOPICAL
Status: CANCELLED | OUTPATIENT
Start: 2024-07-30

## 2024-07-30 RX ORDER — METOPROLOL SUCCINATE 50 MG/1
50 TABLET, EXTENDED RELEASE ORAL DAILY
Qty: 90 TABLET | Refills: 0 | Status: SHIPPED | OUTPATIENT
Start: 2024-07-30

## 2024-07-30 RX ORDER — CHLORTHALIDONE 25 MG/1
25 TABLET ORAL DAILY
Qty: 90 TABLET | Refills: 3 | Status: SHIPPED | OUTPATIENT
Start: 2024-07-30 | End: 2025-07-30

## 2024-07-30 RX ORDER — AMLODIPINE BESYLATE 10 MG/1
10 TABLET ORAL DAILY
Qty: 90 TABLET | Refills: 3 | Status: SHIPPED | OUTPATIENT
Start: 2024-07-30 | End: 2025-07-30

## 2024-07-30 RX ORDER — FLUOCINONIDE 0.5 MG/G
OINTMENT TOPICAL 2 TIMES DAILY
Qty: 60 G | Refills: 1 | Status: SHIPPED | OUTPATIENT
Start: 2024-07-30 | End: 2025-07-30

## 2024-07-30 ASSESSMENT — PATIENT HEALTH QUESTIONNAIRE - PHQ9
1. LITTLE INTEREST OR PLEASURE IN DOING THINGS: NOT AT ALL
SUM OF ALL RESPONSES TO PHQ9 QUESTIONS 1 AND 2: 0
2. FEELING DOWN, DEPRESSED OR HOPELESS: NOT AT ALL

## 2024-07-30 NOTE — PROGRESS NOTES
"Subjective   Reason for Visit: Twyla Mendoza is an 63 y.o. female here for follow up of hypertension diabetes hyperlipidemia A1c is well-controlled blood pressure is well-controlled has lost weight feels better              HPI  Patient is here for follow-up of diabetes hypertension hyperlipidemia taking meds as prescribed tolerating statin is taking Ozempic and metformin has lost weight feels better  Patient Care Team:  Adriel Hurtado MD as PCP - General  Adriel Hurtado MD as PCP - [employer] PCP     Review of Systems   Constitutional:  Negative for chills and fever.   HENT: Negative.     Respiratory: Negative.     Cardiovascular: Negative.    Gastrointestinal: Negative.  Negative for nausea and vomiting.   Endocrine: Negative.    Genitourinary: Negative.    Musculoskeletal:  Positive for arthralgias and back pain.   Skin: Negative.  Negative for rash.   Allergic/Immunologic: Negative.    Neurological: Negative.    Hematological: Negative.    Psychiatric/Behavioral: Negative.     All other systems reviewed and are negative.      Objective   Vitals:  /85   Pulse 86   Resp 20   Ht 1.651 m (5' 5\")   Wt 93.3 kg (205 lb 9.6 oz)   SpO2 95%   BMI 34.21 kg/m²       Physical Exam  Constitutional:       Appearance: Normal appearance.   Cardiovascular:      Rate and Rhythm: Normal rate and regular rhythm.   Pulmonary:      Effort: Pulmonary effort is normal.      Breath sounds: Normal breath sounds.   Abdominal:      General: Bowel sounds are normal.   Neurological:      General: No focal deficit present.      Mental Status: She is alert.   Psychiatric:         Mood and Affect: Mood normal.         Assessment/Plan   Problem List Items Addressed This Visit       Diabetes mellitus type 2, uncomplicated (Multi)    Current Assessment & Plan     Continue medication tolerating Ozempic well A1c is improved at 5.6         Relevant Medications    metFORMIN (Glucophage) 500 mg tablet    Other Relevant " Orders    POCT glycosylated hemoglobin (Hb A1C) manually resulted (Completed)    Dyslipidemia    Current Assessment & Plan     Continue rosuvastatin patient is tolerating well he is eating healthier         Relevant Medications    rosuvastatin (Crestor) 10 mg tablet    HTN (hypertension)    Current Assessment & Plan     Continue losartan blood pressure is well-controlled will monitor urine albumin         Relevant Medications    amLODIPine (Norvasc) 10 mg tablet    chlorthalidone (Hygroton) 25 mg tablet    metoprolol succinate XL (Toprol-XL) 50 mg 24 hr tablet    olmesartan (BENIcar) 40 mg tablet    OAB (overactive bladder)    Relevant Medications    solifenacin (VESIcare) 10 mg tablet    Petechiae    Relevant Medications    fluocinonide (Lidex) 0.05 % ointment    Visit for screening mammogram - Primary    Relevant Orders    BI mammo bilateral screening tomosynthesis    Acute non-recurrent maxillary sinusitis    Relevant Medications    fluticasone (Flonase) 50 mcg/actuation nasal spray   patient is advised to lose weight by reducing calorie intake and increasing activity levels, especially aerobic exercise 15 minutes spent on obesity counseling

## 2024-07-31 ASSESSMENT — ENCOUNTER SYMPTOMS
ARTHRALGIAS: 1
ENDOCRINE NEGATIVE: 1
GASTROINTESTINAL NEGATIVE: 1
CHILLS: 0
NAUSEA: 0
NEUROLOGICAL NEGATIVE: 1
BACK PAIN: 1
VOMITING: 0
CARDIOVASCULAR NEGATIVE: 1
HEMATOLOGIC/LYMPHATIC NEGATIVE: 1
RESPIRATORY NEGATIVE: 1
PSYCHIATRIC NEGATIVE: 1
FEVER: 0
ALLERGIC/IMMUNOLOGIC NEGATIVE: 1

## 2024-08-13 ENCOUNTER — TELEPHONE (OUTPATIENT)
Dept: PRIMARY CARE | Facility: CLINIC | Age: 64
End: 2024-08-13
Payer: MEDICARE

## 2024-08-14 NOTE — TELEPHONE ENCOUNTER
Have to check with the pharm I dont have on current med list and no denial was sent for it - has [employer]

## 2024-08-26 ENCOUNTER — TELEPHONE (OUTPATIENT)
Dept: PRIMARY CARE | Facility: CLINIC | Age: 64
End: 2024-08-26
Payer: MEDICARE

## 2024-08-30 ENCOUNTER — HOSPITAL ENCOUNTER (OUTPATIENT)
Dept: RADIOLOGY | Facility: CLINIC | Age: 64
Discharge: HOME | End: 2024-08-30
Payer: MEDICARE

## 2024-08-30 VITALS — BODY MASS INDEX: 34.11 KG/M2 | WEIGHT: 205 LBS

## 2024-08-30 DIAGNOSIS — Z12.31 VISIT FOR SCREENING MAMMOGRAM: ICD-10-CM

## 2024-08-30 PROCEDURE — 77067 SCR MAMMO BI INCL CAD: CPT

## 2024-10-31 ENCOUNTER — APPOINTMENT (OUTPATIENT)
Dept: PRIMARY CARE | Facility: CLINIC | Age: 64
End: 2024-10-31
Payer: MEDICARE

## 2024-11-11 ENCOUNTER — LAB (OUTPATIENT)
Dept: LAB | Facility: LAB | Age: 64
End: 2024-11-11
Payer: MEDICARE

## 2024-11-11 ENCOUNTER — APPOINTMENT (OUTPATIENT)
Dept: PRIMARY CARE | Facility: CLINIC | Age: 64
End: 2024-11-11
Payer: MEDICARE

## 2024-11-11 VITALS
WEIGHT: 219.2 LBS | HEART RATE: 80 BPM | BODY MASS INDEX: 36.48 KG/M2 | DIASTOLIC BLOOD PRESSURE: 84 MMHG | SYSTOLIC BLOOD PRESSURE: 131 MMHG

## 2024-11-11 DIAGNOSIS — R23.3 PETECHIAE: ICD-10-CM

## 2024-11-11 DIAGNOSIS — I10 PRIMARY HYPERTENSION: ICD-10-CM

## 2024-11-11 DIAGNOSIS — E11.9 TYPE 2 DIABETES MELLITUS WITHOUT COMPLICATION, WITHOUT LONG-TERM CURRENT USE OF INSULIN (MULTI): ICD-10-CM

## 2024-11-11 DIAGNOSIS — J01.00 ACUTE NON-RECURRENT MAXILLARY SINUSITIS: ICD-10-CM

## 2024-11-11 DIAGNOSIS — E11.9 TYPE 2 DIABETES MELLITUS WITHOUT COMPLICATION, UNSPECIFIED WHETHER LONG TERM INSULIN USE (MULTI): ICD-10-CM

## 2024-11-11 DIAGNOSIS — F51.01 PRIMARY INSOMNIA: ICD-10-CM

## 2024-11-11 DIAGNOSIS — N32.81 OAB (OVERACTIVE BLADDER): ICD-10-CM

## 2024-11-11 DIAGNOSIS — E78.5 DYSLIPIDEMIA: ICD-10-CM

## 2024-11-11 DIAGNOSIS — Z00.00 ANNUAL PHYSICAL EXAM: Primary | ICD-10-CM

## 2024-11-11 DIAGNOSIS — Z00.00 ANNUAL PHYSICAL EXAM: ICD-10-CM

## 2024-11-11 LAB
ALBUMIN SERPL BCP-MCNC: 4.4 G/DL (ref 3.4–5)
ALP SERPL-CCNC: 64 U/L (ref 33–136)
ALT SERPL W P-5'-P-CCNC: 16 U/L (ref 7–45)
ANION GAP SERPL CALC-SCNC: 13 MMOL/L (ref 10–20)
AST SERPL W P-5'-P-CCNC: 15 U/L (ref 9–39)
BILIRUB SERPL-MCNC: 0.3 MG/DL (ref 0–1.2)
BUN SERPL-MCNC: 16 MG/DL (ref 6–23)
CALCIUM SERPL-MCNC: 9.2 MG/DL (ref 8.6–10.6)
CHLORIDE SERPL-SCNC: 103 MMOL/L (ref 98–107)
CHOLEST SERPL-MCNC: 165 MG/DL (ref 0–199)
CHOLESTEROL/HDL RATIO: 3.8
CO2 SERPL-SCNC: 30 MMOL/L (ref 21–32)
CREAT SERPL-MCNC: 1.02 MG/DL (ref 0.5–1.05)
EGFRCR SERPLBLD CKD-EPI 2021: 62 ML/MIN/1.73M*2
ERYTHROCYTE [DISTWIDTH] IN BLOOD BY AUTOMATED COUNT: 14.3 % (ref 11.5–14.5)
EST. AVERAGE GLUCOSE BLD GHB EST-MCNC: 128 MG/DL
GLUCOSE SERPL-MCNC: 75 MG/DL (ref 74–99)
HBA1C MFR BLD: 6.1 %
HCT VFR BLD AUTO: 44.6 % (ref 36–46)
HDLC SERPL-MCNC: 44 MG/DL
HGB BLD-MCNC: 14.6 G/DL (ref 12–16)
LDLC SERPL CALC-MCNC: 90 MG/DL
MCH RBC QN AUTO: 29.6 PG (ref 26–34)
MCHC RBC AUTO-ENTMCNC: 32.7 G/DL (ref 32–36)
MCV RBC AUTO: 91 FL (ref 80–100)
NON HDL CHOLESTEROL: 121 MG/DL (ref 0–149)
NRBC BLD-RTO: 0 /100 WBCS (ref 0–0)
PLATELET # BLD AUTO: 288 X10*3/UL (ref 150–450)
POTASSIUM SERPL-SCNC: 4.2 MMOL/L (ref 3.5–5.3)
PROT SERPL-MCNC: 7.3 G/DL (ref 6.4–8.2)
RBC # BLD AUTO: 4.93 X10*6/UL (ref 4–5.2)
SODIUM SERPL-SCNC: 142 MMOL/L (ref 136–145)
TRIGL SERPL-MCNC: 155 MG/DL (ref 0–149)
VLDL: 31 MG/DL (ref 0–40)
WBC # BLD AUTO: 6.1 X10*3/UL (ref 4.4–11.3)

## 2024-11-11 PROCEDURE — 85027 COMPLETE CBC AUTOMATED: CPT

## 2024-11-11 PROCEDURE — 1036F TOBACCO NON-USER: CPT | Performed by: FAMILY MEDICINE

## 2024-11-11 PROCEDURE — 3048F LDL-C <100 MG/DL: CPT | Performed by: FAMILY MEDICINE

## 2024-11-11 PROCEDURE — 3075F SYST BP GE 130 - 139MM HG: CPT | Performed by: FAMILY MEDICINE

## 2024-11-11 PROCEDURE — 99396 PREV VISIT EST AGE 40-64: CPT | Performed by: FAMILY MEDICINE

## 2024-11-11 PROCEDURE — 36415 COLL VENOUS BLD VENIPUNCTURE: CPT

## 2024-11-11 PROCEDURE — 83036 HEMOGLOBIN GLYCOSYLATED A1C: CPT

## 2024-11-11 PROCEDURE — 4010F ACE/ARB THERAPY RXD/TAKEN: CPT | Performed by: FAMILY MEDICINE

## 2024-11-11 PROCEDURE — 3060F POS MICROALBUMINURIA REV: CPT | Performed by: FAMILY MEDICINE

## 2024-11-11 PROCEDURE — 3079F DIAST BP 80-89 MM HG: CPT | Performed by: FAMILY MEDICINE

## 2024-11-11 PROCEDURE — G0008 ADMIN INFLUENZA VIRUS VAC: HCPCS | Performed by: FAMILY MEDICINE

## 2024-11-11 PROCEDURE — 80061 LIPID PANEL: CPT

## 2024-11-11 PROCEDURE — 80053 COMPREHEN METABOLIC PANEL: CPT

## 2024-11-11 PROCEDURE — 90656 IIV3 VACC NO PRSV 0.5 ML IM: CPT | Performed by: FAMILY MEDICINE

## 2024-11-11 PROCEDURE — 99214 OFFICE O/P EST MOD 30 MIN: CPT | Performed by: FAMILY MEDICINE

## 2024-11-11 RX ORDER — MAGNESIUM GLUCONATE 27.5 (500)
1 TABLET ORAL DAILY
Status: CANCELLED | OUTPATIENT
Start: 2024-11-11

## 2024-11-11 RX ORDER — TRIAMCINOLONE ACETONIDE 1 MG/G
CREAM TOPICAL
Status: CANCELLED | OUTPATIENT
Start: 2024-11-11

## 2024-11-11 RX ORDER — SEMAGLUTIDE 2.68 MG/ML
2 INJECTION, SOLUTION SUBCUTANEOUS
Qty: 9 ML | Refills: 3 | Status: CANCELLED | OUTPATIENT
Start: 2024-11-11

## 2024-11-11 RX ORDER — ROSUVASTATIN CALCIUM 10 MG/1
10 TABLET, COATED ORAL DAILY
Qty: 90 TABLET | Refills: 0 | Status: SHIPPED | OUTPATIENT
Start: 2024-11-11

## 2024-11-11 RX ORDER — FLUOCINONIDE 0.5 MG/G
OINTMENT TOPICAL 2 TIMES DAILY
Qty: 60 G | Refills: 1 | Status: SHIPPED | OUTPATIENT
Start: 2024-11-11 | End: 2025-11-11

## 2024-11-11 RX ORDER — ACETAMINOPHEN 500 MG
5000 TABLET ORAL DAILY
Status: CANCELLED | OUTPATIENT
Start: 2024-11-11

## 2024-11-11 RX ORDER — ASPIRIN 81 MG/1
TABLET ORAL
Status: CANCELLED | OUTPATIENT
Start: 2024-11-11

## 2024-11-11 RX ORDER — AMLODIPINE BESYLATE 10 MG/1
10 TABLET ORAL DAILY
Qty: 90 TABLET | Refills: 3 | Status: SHIPPED | OUTPATIENT
Start: 2024-11-11 | End: 2025-11-11

## 2024-11-11 RX ORDER — CHONDROITIN/COLLAGEN/HYALURON 500 MG
1 CAPSULE ORAL DAILY
Status: CANCELLED | OUTPATIENT
Start: 2024-11-11

## 2024-11-11 RX ORDER — MULTIVIT-MIN/IRON/FOLIC ACID/K 18-600-40
1 CAPSULE ORAL DAILY
Qty: 60 CAPSULE | Status: CANCELLED | OUTPATIENT
Start: 2024-11-11

## 2024-11-11 RX ORDER — CHLORTHALIDONE 25 MG/1
25 TABLET ORAL DAILY
Qty: 90 TABLET | Refills: 3 | Status: SHIPPED | OUTPATIENT
Start: 2024-11-11 | End: 2025-11-11

## 2024-11-11 RX ORDER — OLMESARTAN MEDOXOMIL 40 MG/1
40 TABLET ORAL DAILY
Qty: 90 TABLET | Refills: 3 | Status: SHIPPED | OUTPATIENT
Start: 2024-11-11 | End: 2025-11-11

## 2024-11-11 RX ORDER — METFORMIN HYDROCHLORIDE 500 MG/1
500 TABLET ORAL 2 TIMES DAILY
Qty: 180 TABLET | Refills: 1 | Status: SHIPPED | OUTPATIENT
Start: 2024-11-11

## 2024-11-11 RX ORDER — METOPROLOL SUCCINATE 50 MG/1
50 TABLET, EXTENDED RELEASE ORAL DAILY
Qty: 90 TABLET | Refills: 0 | Status: SHIPPED | OUTPATIENT
Start: 2024-11-11

## 2024-11-11 RX ORDER — ACETAMINOPHEN, DIPHENHYDRAMINE HCL, PHENYLEPHRINE HCL 325; 25; 5 MG/1; MG/1; MG/1
1 TABLET ORAL NIGHTLY
Qty: 90 TABLET | Refills: 1 | Status: SHIPPED | OUTPATIENT
Start: 2024-11-11

## 2024-11-11 RX ORDER — FLUTICASONE PROPIONATE 50 MCG
2 SPRAY, SUSPENSION (ML) NASAL DAILY
Qty: 16 G | Refills: 2 | Status: SHIPPED | OUTPATIENT
Start: 2024-11-11

## 2024-11-11 RX ORDER — SOLIFENACIN SUCCINATE 10 MG/1
10 TABLET, FILM COATED ORAL NIGHTLY
Qty: 90 TABLET | Refills: 1 | Status: SHIPPED | OUTPATIENT
Start: 2024-11-11

## 2024-11-11 ASSESSMENT — ENCOUNTER SYMPTOMS
DEPRESSION: 0
LOSS OF SENSATION IN FEET: 0
OCCASIONAL FEELINGS OF UNSTEADINESS: 0

## 2024-11-12 NOTE — PROGRESS NOTES
Subjective     Patient ID: ALBERTO Mendoza is a 63 y.o. female who presents for Follow-up.      Last Physical :  1 Years ago     Pt's PMH, PSH, SH, FH , meds and allergies was obtained / reviewed and updated .     Dental visits : Y  Vision issues : N  Hearing issues : N    Immunizations : Y    Diet :  could be better  Exercise:  Weight concerns :     Alcohol: as noted in SH  Tobacco: as noted in SH  Recreational drug use : None/ as noted in SH    Sexually active : Active   Contraception :   Menstrual problems:  Premenopausal/perimenopausal/ postmenopausal:    G:  Parity:  Full term:    Premature:   (s):   Living :  Ab induced:   Ab spontaneous :  Ectopic :   Multiple :    PAP smear :  Mammogram :  Colonoscopy:    Metabolic screening   - Lipid   - Glucose    DM controlled  Has lost weight  Eating better  BP controlled  NO CP SOB    ======================================================    Visit Vitals  /84   Pulse 80   Wt 99.4 kg (219 lb 3.2 oz)   LMP  (LMP Unknown) Comment: hysterectomy at 41   BMI 36.48 kg/m²   OB Status Hysterectomy   Smoking Status Never   BSA 2.14 m²      No LMP recorded (lmp unknown). Patient has had a hysterectomy.     =====================================    Review of systems:  Constitutional: no chills, no fever and no night sweats.     Eyes: no blurred vision and no eyesight problems.     ENT: no hearing loss, no nasal congestion, no nasal discharge, no hoarseness and no sore throat.     Cardiovascular: no chest pain, no intermittent leg claudication, no lower extremity edema, no palpitations and no syncope.     Respiratory: no cough, no shortness of breath during exertion, no shortness of breath at rest and no wheezing.     Gastrointestinal: no abdominal pain, no blood in stools, no constipation, no diarrhea, no melena, no nausea, no rectal pain and no vomiting.     Genitourinary: no dysuria, no change in urinary frequency, no urinary hesitancy, no feelings of urinary  urgency and no vaginal discharge.     Musculoskeletal: no arthralgias, no back pain and no myalgias.     Integumentary: no new skin lesions and no rashes.     Neurological: no difficulty walking, no headache, no limb weakness, no numbness and no tingling.     Psychiatric: no anxiety, no depression, no anhedonia and no substance use disorders.     Endocrine: no recent weight gain and no recent weight loss.     Hematologic/Lymphatic: no tendency for easy bruising and no swollen glands.   ============================================================    Physical exam :    Constitutional: Alert and in no acute distress. Well developed, well nourished.     Eyes: Normal external exam. Pupils were equal in size, round, reactive to light (PERRL) with normal accommodation and extraocular movements intact (EOMI).     Ears, Nose, Mouth, and Throat: External inspection of ears and nose: Normal.  Otoscopic examination: Normal.      Neck: No neck mass was observed. Supple.     Cardiovascular: Heart rate and rhythm were normal, normal S1 and S2, no gallops, no murmurs and no pericardial rub    Pulmonary: No respiratory distress. Clear bilateral breath sounds.     Abdomen: Soft nontender; no abdominal mass palpated. No organomegaly.     Musculoskeletal: No joint swelling seen, normal movements of all extremities. Range of motion: Normal.  Muscle strength/tone: Normal.      Skin: Normal skin color and pigmentation, normal skin turgor, and no rash.     Neurologic: Deep tendon reflexes were 2+ and symmetric. Sensation: Normal.     Psychiatric: Judgment and insight: Intact. Mood and affect: Normal.    Lymphatic : Cervical/ axillary/ groin Lns Palpable/ non palpable            All other systems have been reviewed and are negative for complaint.      Assessment/Plan    Problem List Items Addressed This Visit             ICD-10-CM    Diabetes mellitus type 2, uncomplicated (Multi) E11.9    Relevant Medications    metFORMIN (Glucophage) 500  mg tablet    Other Relevant Orders    Comprehensive Metabolic Panel (Completed)    Lipid Panel (Completed)    Hemoglobin A1C    CBC (Completed)    Comprehensive Metabolic Panel (Completed)    Lipid Panel (Completed)    Hemoglobin A1C    Dyslipidemia E78.5    Relevant Medications    rosuvastatin (Crestor) 10 mg tablet    Other Relevant Orders    Comprehensive Metabolic Panel (Completed)    Lipid Panel (Completed)    Hemoglobin A1C    HTN (hypertension) I10    Relevant Medications    amLODIPine (Norvasc) 10 mg tablet    chlorthalidone (Hygroton) 25 mg tablet    metoprolol succinate XL (Toprol-XL) 50 mg 24 hr tablet    olmesartan (BENIcar) 40 mg tablet    Other Relevant Orders    Comprehensive Metabolic Panel (Completed)    Lipid Panel (Completed)    Hemoglobin A1C    OAB (overactive bladder) N32.81    Relevant Medications    solifenacin (VESIcare) 10 mg tablet    Other Relevant Orders    Comprehensive Metabolic Panel (Completed)    Lipid Panel (Completed)    Hemoglobin A1C    Petechiae R23.3    Relevant Medications    fluocinonide (Lidex) 0.05 % ointment    Other Relevant Orders    Comprehensive Metabolic Panel (Completed)    Lipid Panel (Completed)    Hemoglobin A1C    Acute non-recurrent maxillary sinusitis J01.00    Relevant Medications    fluticasone (Flonase) 50 mcg/actuation nasal spray    Other Relevant Orders    Comprehensive Metabolic Panel (Completed)    Lipid Panel (Completed)    Hemoglobin A1C    Primary insomnia F51.01    Relevant Medications    melatonin 10 mg tablet    Other Relevant Orders    Comprehensive Metabolic Panel (Completed)    Lipid Panel (Completed)    Hemoglobin A1C    Annual physical exam - Primary Z00.00    Relevant Orders    Comprehensive Metabolic Panel (Completed)    Lipid Panel (Completed)    Hemoglobin A1C    CBC (Completed)

## 2024-11-25 DIAGNOSIS — E11.9 TYPE 2 DIABETES MELLITUS WITHOUT COMPLICATION, WITHOUT LONG-TERM CURRENT USE OF INSULIN (MULTI): Primary | ICD-10-CM

## 2025-01-13 ENCOUNTER — APPOINTMENT (OUTPATIENT)
Dept: PRIMARY CARE | Facility: CLINIC | Age: 65
End: 2025-01-13
Payer: MEDICARE

## 2025-01-13 VITALS
HEIGHT: 65 IN | DIASTOLIC BLOOD PRESSURE: 99 MMHG | BODY MASS INDEX: 37.02 KG/M2 | SYSTOLIC BLOOD PRESSURE: 166 MMHG | WEIGHT: 222.2 LBS | HEART RATE: 77 BPM

## 2025-01-13 DIAGNOSIS — Z13.6 SCREENING FOR CARDIOVASCULAR CONDITION: ICD-10-CM

## 2025-01-13 DIAGNOSIS — E78.5 DYSLIPIDEMIA: ICD-10-CM

## 2025-01-13 DIAGNOSIS — N32.81 OAB (OVERACTIVE BLADDER): ICD-10-CM

## 2025-01-13 DIAGNOSIS — N18.31 CHRONIC KIDNEY DISEASE (CKD) STAGE G3A/A1, MODERATELY DECREASED GLOMERULAR FILTRATION RATE (GFR) BETWEEN 45-59 ML/MIN/1.73 SQUARE METER AND ALBUMINURIA CREATININE RATIO LESS THAN 30 MG/G (CMS* (MULTI): ICD-10-CM

## 2025-01-13 DIAGNOSIS — R23.3 PETECHIAE: ICD-10-CM

## 2025-01-13 DIAGNOSIS — I10 PRIMARY HYPERTENSION: ICD-10-CM

## 2025-01-13 DIAGNOSIS — Z00.00 ROUTINE GENERAL MEDICAL EXAMINATION AT A HEALTH CARE FACILITY: Primary | ICD-10-CM

## 2025-01-13 DIAGNOSIS — Z00.00 ROUTINE GENERAL MEDICAL EXAMINATION AT HEALTH CARE FACILITY: ICD-10-CM

## 2025-01-13 DIAGNOSIS — E11.620 TYPE 2 DIABETES MELLITUS WITH DIABETIC DERMATITIS, WITHOUT LONG-TERM CURRENT USE OF INSULIN: ICD-10-CM

## 2025-01-13 DIAGNOSIS — E11.9 TYPE 2 DIABETES MELLITUS WITHOUT COMPLICATION, UNSPECIFIED WHETHER LONG TERM INSULIN USE (MULTI): ICD-10-CM

## 2025-01-13 DIAGNOSIS — J01.00 ACUTE NON-RECURRENT MAXILLARY SINUSITIS: ICD-10-CM

## 2025-01-13 DIAGNOSIS — E66.812 CLASS 2 SEVERE OBESITY WITH BODY MASS INDEX (BMI) OF 35 TO 39.9 WITH SERIOUS COMORBIDITY: ICD-10-CM

## 2025-01-13 DIAGNOSIS — E11.9 TYPE 2 DIABETES MELLITUS WITHOUT COMPLICATION, WITHOUT LONG-TERM CURRENT USE OF INSULIN (MULTI): ICD-10-CM

## 2025-01-13 DIAGNOSIS — F32.0 DEPRESSION, MAJOR, SINGLE EPISODE, MILD (CMS-HCC): ICD-10-CM

## 2025-01-13 DIAGNOSIS — E66.01 CLASS 2 SEVERE OBESITY WITH BODY MASS INDEX (BMI) OF 35 TO 39.9 WITH SERIOUS COMORBIDITY: ICD-10-CM

## 2025-01-13 PROCEDURE — 1036F TOBACCO NON-USER: CPT | Performed by: FAMILY MEDICINE

## 2025-01-13 PROCEDURE — 3077F SYST BP >= 140 MM HG: CPT | Performed by: FAMILY MEDICINE

## 2025-01-13 PROCEDURE — 99214 OFFICE O/P EST MOD 30 MIN: CPT | Performed by: FAMILY MEDICINE

## 2025-01-13 PROCEDURE — 1159F MED LIST DOCD IN RCRD: CPT | Performed by: FAMILY MEDICINE

## 2025-01-13 PROCEDURE — 1160F RVW MEDS BY RX/DR IN RCRD: CPT | Performed by: FAMILY MEDICINE

## 2025-01-13 PROCEDURE — 4010F ACE/ARB THERAPY RXD/TAKEN: CPT | Performed by: FAMILY MEDICINE

## 2025-01-13 PROCEDURE — 3008F BODY MASS INDEX DOCD: CPT | Performed by: FAMILY MEDICINE

## 2025-01-13 PROCEDURE — 1123F ACP DISCUSS/DSCN MKR DOCD: CPT | Performed by: FAMILY MEDICINE

## 2025-01-13 PROCEDURE — G0439 PPPS, SUBSEQ VISIT: HCPCS | Performed by: FAMILY MEDICINE

## 2025-01-13 PROCEDURE — 3080F DIAST BP >= 90 MM HG: CPT | Performed by: FAMILY MEDICINE

## 2025-01-13 PROCEDURE — 1170F FXNL STATUS ASSESSED: CPT | Performed by: FAMILY MEDICINE

## 2025-01-13 PROCEDURE — G0446 INTENS BEHAVE THER CARDIO DX: HCPCS | Performed by: FAMILY MEDICINE

## 2025-01-13 RX ORDER — OLMESARTAN MEDOXOMIL 40 MG/1
40 TABLET ORAL DAILY
Qty: 90 TABLET | Refills: 3 | Status: SHIPPED | OUTPATIENT
Start: 2025-01-13 | End: 2026-01-13

## 2025-01-13 RX ORDER — CHLORTHALIDONE 25 MG/1
25 TABLET ORAL DAILY
Qty: 90 TABLET | Refills: 3 | Status: SHIPPED | OUTPATIENT
Start: 2025-01-13 | End: 2026-01-13

## 2025-01-13 RX ORDER — SEMAGLUTIDE 2.68 MG/ML
2 INJECTION, SOLUTION SUBCUTANEOUS
Qty: 9 ML | Refills: 3 | Status: CANCELLED | OUTPATIENT
Start: 2025-01-13

## 2025-01-13 RX ORDER — METFORMIN HYDROCHLORIDE 500 MG/1
500 TABLET ORAL 2 TIMES DAILY
Qty: 180 TABLET | Refills: 1 | Status: SHIPPED | OUTPATIENT
Start: 2025-01-13

## 2025-01-13 RX ORDER — SOLIFENACIN SUCCINATE 10 MG/1
10 TABLET, FILM COATED ORAL NIGHTLY
Qty: 90 TABLET | Refills: 1 | Status: SHIPPED | OUTPATIENT
Start: 2025-01-13

## 2025-01-13 RX ORDER — TRIAMCINOLONE ACETONIDE 1 MG/G
CREAM TOPICAL
Status: CANCELLED | OUTPATIENT
Start: 2025-01-13

## 2025-01-13 RX ORDER — ROSUVASTATIN CALCIUM 10 MG/1
10 TABLET, COATED ORAL DAILY
Qty: 90 TABLET | Refills: 0 | Status: SHIPPED | OUTPATIENT
Start: 2025-01-13

## 2025-01-13 RX ORDER — PNEUMOCOCCAL 20-VALENT CONJUGATE VACCINE 2.2; 2.2; 2.2; 2.2; 2.2; 2.2; 2.2; 2.2; 2.2; 2.2; 2.2; 2.2; 2.2; 2.2; 2.2; 2.2; 4.4; 2.2; 2.2; 2.2 UG/.5ML; UG/.5ML; UG/.5ML; UG/.5ML; UG/.5ML; UG/.5ML; UG/.5ML; UG/.5ML; UG/.5ML; UG/.5ML; UG/.5ML; UG/.5ML; UG/.5ML; UG/.5ML; UG/.5ML; UG/.5ML; UG/.5ML; UG/.5ML; UG/.5ML; UG/.5ML
0.5 INJECTION, SUSPENSION INTRAMUSCULAR ONCE
Qty: 0.5 ML | Refills: 0 | Status: SHIPPED | OUTPATIENT
Start: 2025-01-13 | End: 2025-01-13

## 2025-01-13 RX ORDER — FLUOCINONIDE 0.5 MG/G
OINTMENT TOPICAL 2 TIMES DAILY
Qty: 60 G | Refills: 1 | Status: SHIPPED | OUTPATIENT
Start: 2025-01-13 | End: 2026-01-13

## 2025-01-13 RX ORDER — AMLODIPINE BESYLATE 10 MG/1
10 TABLET ORAL DAILY
Qty: 90 TABLET | Refills: 3 | Status: SHIPPED | OUTPATIENT
Start: 2025-01-13 | End: 2026-01-13

## 2025-01-13 RX ORDER — METOPROLOL SUCCINATE 50 MG/1
50 TABLET, EXTENDED RELEASE ORAL DAILY
Qty: 90 TABLET | Refills: 0 | Status: SHIPPED | OUTPATIENT
Start: 2025-01-13

## 2025-01-13 RX ORDER — FLUTICASONE PROPIONATE 50 MCG
2 SPRAY, SUSPENSION (ML) NASAL DAILY
Qty: 48 G | Refills: 2 | Status: SHIPPED | OUTPATIENT
Start: 2025-01-13

## 2025-01-13 ASSESSMENT — PATIENT HEALTH QUESTIONNAIRE - PHQ9
1. LITTLE INTEREST OR PLEASURE IN DOING THINGS: NOT AT ALL
2. FEELING DOWN, DEPRESSED OR HOPELESS: NOT AT ALL
SUM OF ALL RESPONSES TO PHQ9 QUESTIONS 1 AND 2: 0

## 2025-01-13 ASSESSMENT — ACTIVITIES OF DAILY LIVING (ADL)
DOING_HOUSEWORK: INDEPENDENT
MANAGING_FINANCES: INDEPENDENT
GROCERY_SHOPPING: INDEPENDENT
DRESSING: INDEPENDENT
BATHING: INDEPENDENT
TAKING_MEDICATION: INDEPENDENT

## 2025-01-13 ASSESSMENT — ENCOUNTER SYMPTOMS
DEPRESSION: 0
OCCASIONAL FEELINGS OF UNSTEADINESS: 0
LOSS OF SENSATION IN FEET: 0

## 2025-01-19 PROBLEM — Z13.6 SCREENING FOR CARDIOVASCULAR CONDITION: Status: ACTIVE | Noted: 2025-01-19

## 2025-01-19 PROBLEM — E11.620 TYPE 2 DIABETES MELLITUS WITH DIABETIC DERMATITIS, WITHOUT LONG-TERM CURRENT USE OF INSULIN: Status: ACTIVE | Noted: 2025-01-19

## 2025-01-19 ASSESSMENT — ENCOUNTER SYMPTOMS
NEUROLOGICAL NEGATIVE: 1
RESPIRATORY NEGATIVE: 1
CHILLS: 0
SINUS COMPLAINT: 1
FEVER: 0
ALLERGIC/IMMUNOLOGIC NEGATIVE: 1
GASTROINTESTINAL NEGATIVE: 1
MUSCULOSKELETAL NEGATIVE: 1
ENDOCRINE NEGATIVE: 1
VOMITING: 0
NAUSEA: 0
HEMATOLOGIC/LYMPHATIC NEGATIVE: 1
CARDIOVASCULAR NEGATIVE: 1
PSYCHIATRIC NEGATIVE: 1

## 2025-01-20 NOTE — ASSESSMENT & PLAN NOTE
Education provided re: diabetes pathophysiology and management, including effectiveness of diet, exercise, and medication in controlling sugar and preventing complications, as well as information on the complications and recommended care. I want your fasting morning blood sugar to be less than 100.   I want your sugar to be less than 140 two hours after a meal.     show

## 2025-01-20 NOTE — PROGRESS NOTES
Subjective   Patient ID: Twyla Mendoza is a 64 y.o. female who presents for Medicare Annual Wellness Visit Subsequent.    Patient is here for follow-up of hypertension hyperlipidemia has been taking meds as prescribed denies chest pain shortness of breath current allergies myalgias dizziness been eating healthy and trying to increase exercise  DM fair control Unabel t oget Ozempic due t ocost     c/o headache fever facial pain cough ear pain cough green phlem.  ST .     Current Outpatient Medications on File Prior to Visit   Medication Sig Dispense Refill    ascorbic acid, vitamin C, 500 mg capsule Take 1 tablet by mouth once daily.      aspirin 81 mg EC tablet Take by mouth.      cholecalciferol (Vitamin D-3) 5,000 Units tablet Take 1 tablet (5,000 Units) by mouth once daily.      magnesium gluconate (Magonate) 27.5 mg magne- sium (500 mg) tablet Take 1 tablet (27.5 mg) by mouth once daily.      melatonin 10 mg tablet Take 1 tablet (10 mg) by mouth once daily at bedtime. 90 tablet 1    multivit-min-mfolate-K-bawc329 (Alive Women's 50 Plus Ultra) 800 mcg DFE- 150 mcg tablet Take 1 tablet by mouth once daily.      OneTouch Ultra Test strip twice a day.      polyethylene glycol-electrolytes (polyethylene glycol) 420 gram solution Take by mouth.      psyllium husk, aspartame, (Metamucil Sugar-Free, aspart,) 3.4 gram/5.8 gram powder Take by mouth twice a day.      triamcinolone (Kenalog) 0.1 % cream apply and rub in a thin flim to affected areas twice daily (am and pm)      [DISCONTINUED] amLODIPine (Norvasc) 10 mg tablet Take 1 tablet (10 mg) by mouth once daily. 90 tablet 3    [DISCONTINUED] chlorthalidone (Hygroton) 25 mg tablet Take 1 tablet (25 mg) by mouth once daily. 90 tablet 3    [DISCONTINUED] fluocinonide (Lidex) 0.05 % ointment Apply topically 2 times a day. 60 g 1    [DISCONTINUED] fluticasone (Flonase) 50 mcg/actuation nasal spray Administer 2 sprays into each nostril once daily. 16 g 2     "[DISCONTINUED] metFORMIN (Glucophage) 500 mg tablet Take 1 tablet (500 mg) by mouth 2 times a day. 180 tablet 1    [DISCONTINUED] metoprolol succinate XL (Toprol-XL) 50 mg 24 hr tablet Take 1 tablet (50 mg) by mouth once daily. 90 tablet 0    [DISCONTINUED] olmesartan (BENIcar) 40 mg tablet Take 1 tablet (40 mg) by mouth once daily. 90 tablet 3    [DISCONTINUED] rosuvastatin (Crestor) 10 mg tablet Take 1 tablet (10 mg) by mouth once daily. 90 tablet 0    [DISCONTINUED] semaglutide (Ozempic) 2 mg/dose (8 mg/3 mL) pen injector Inject 2 mg under the skin every 7 days. 9 mL 3    [DISCONTINUED] solifenacin (VESIcare) 10 mg tablet Take 1 tablet (10 mg) by mouth once daily at bedtime. 90 tablet 1     No current facility-administered medications on file prior to visit.        Review of Systems   Constitutional:  Negative for chills and fever.   HENT:  Positive for congestion.    Respiratory: Negative.     Cardiovascular: Negative.    Gastrointestinal: Negative.  Negative for nausea and vomiting.   Endocrine: Negative.    Genitourinary: Negative.    Musculoskeletal: Negative.    Skin: Negative.  Negative for rash.   Allergic/Immunologic: Negative.    Neurological: Negative.    Hematological: Negative.    Psychiatric/Behavioral: Negative.     All other systems reviewed and are negative.      Objective   BP (!) 166/99   Pulse 77   Ht 1.651 m (5' 5\")   Wt 101 kg (222 lb 3.2 oz)   LMP  (LMP Unknown) Comment: hysterectomy at 41  BMI 36.98 kg/m²   BSA: 2.15 meters squared  Growth percentiles: Facility age limit for growth %lara is 20 years. Facility age limit for growth %lara is 20 years.   No visits with results within 1 Week(s) from this visit.   Latest known visit with results is:   Lab on 11/11/2024   Component Date Value Ref Range Status    Glucose 11/11/2024 75  74 - 99 mg/dL Final    Sodium 11/11/2024 142  136 - 145 mmol/L Final    Potassium 11/11/2024 4.2  3.5 - 5.3 mmol/L Final    Chloride 11/11/2024 103  98 - 107 " mmol/L Final    Bicarbonate 11/11/2024 30  21 - 32 mmol/L Final    Anion Gap 11/11/2024 13  10 - 20 mmol/L Final    Urea Nitrogen 11/11/2024 16  6 - 23 mg/dL Final    Creatinine 11/11/2024 1.02  0.50 - 1.05 mg/dL Final    eGFR 11/11/2024 62  >60 mL/min/1.73m*2 Final    Calculations of estimated GFR are performed using the 2021 CKD-EPI Study Refit equation without the race variable for the IDMS-Traceable creatinine methods.  https://jasn.asnjournals.org/content/early/2021/09/22/ASN.4264182968    Calcium 11/11/2024 9.2  8.6 - 10.6 mg/dL Final    Albumin 11/11/2024 4.4  3.4 - 5.0 g/dL Final    Alkaline Phosphatase 11/11/2024 64  33 - 136 U/L Final    Total Protein 11/11/2024 7.3  6.4 - 8.2 g/dL Final    AST 11/11/2024 15  9 - 39 U/L Final    Bilirubin, Total 11/11/2024 0.3  0.0 - 1.2 mg/dL Final    ALT 11/11/2024 16  7 - 45 U/L Final    Patients treated with Sulfasalazine may generate falsely decreased results for ALT.    Cholesterol 11/11/2024 165  0 - 199 mg/dL Final          Age      Desirable   Borderline High   High     0-19 Y     0 - 169       170 - 199     >/= 200    20-24 Y     0 - 189       190 - 224     >/= 225         >24 Y     0 - 199       200 - 239     >/= 240   **All ranges are based on fasting samples. Specific   therapeutic targets will vary based on patient-specific   cardiac risk.    Pediatric guidelines reference:Pediatrics 2011, 128(S5).Adult guidelines reference: NCEP ATPIII Guidelines,MOHINDER 2001, 258:2486-97    Venipuncture immediately after or during the administration of Metamizole may lead to falsely low results. Testing should be performed immediately prior to Metamizole dosing.    HDL-Cholesterol 11/11/2024 44.0  mg/dL Final      Age       Very Low   Low     Normal    High    0-19 Y    < 35      < 40     40-45     ----  20-24 Y    ----     < 40      >45      ----        >24 Y      ----     < 40     40-60      >60      Cholesterol/HDL Ratio 11/11/2024 3.8   Final      Ref Values  Desirable  <  3.4  High Risk  > 5.0    LDL Calculated 11/11/2024 90  <=99 mg/dL Final                                Near   Borderline      AGE      Desirable  Optimal    High     High     Very High     0-19 Y     0 - 109     ---    110-129   >/= 130     ----    20-24 Y     0 - 119     ---    120-159   >/= 160     ----      >24 Y     0 -  99   100-129  130-159   160-189     >/=190      VLDL 11/11/2024 31  0 - 40 mg/dL Final    Triglycerides 11/11/2024 155 (H)  0 - 149 mg/dL Final    Age              Desirable        Borderline         High        Very High  SEX:B           mg/dL             mg/dL               mg/dL      mg/dL  <=14D                       ----               ----        ----  15D-365D                    ----               ----        ----  1Y-9Y           0-74               75-99             >=100       ----  10Y-19Y        0-89                            >=130       ----  20Y-24Y        0-114             115-149             >=150      ----  >= 25Y         0-149             150-199             200-499    >=500      Venipuncture immediately after or during the administration of Metamizole may lead to falsely low results. Testing should be performed immediately prior to Metamizole dosing.    Non HDL Cholesterol 11/11/2024 121  0 - 149 mg/dL Final          Age       Desirable   Borderline High   High     Very High     0-19 Y     0 - 119       120 - 144     >/= 145    >/= 160    20-24 Y     0 - 149       150 - 189     >/= 190      ----         >24 Y    30 mg/dL above LDL Cholesterol goal      Hemoglobin A1C 11/11/2024 6.1 (H)  See comment % Final    Estimated Average Glucose 11/11/2024 128  Not Established mg/dL Final    WBC 11/11/2024 6.1  4.4 - 11.3 x10*3/uL Final    nRBC 11/11/2024 0.0  0.0 - 0.0 /100 WBCs Final    RBC 11/11/2024 4.93  4.00 - 5.20 x10*6/uL Final    Hemoglobin 11/11/2024 14.6  12.0 - 16.0 g/dL Final    Hematocrit 11/11/2024 44.6  36.0 - 46.0 % Final    MCV 11/11/2024 91  80 - 100  fL Final    MCH 11/11/2024 29.6  26.0 - 34.0 pg Final    MCHC 11/11/2024 32.7  32.0 - 36.0 g/dL Final    RDW 11/11/2024 14.3  11.5 - 14.5 % Final    Platelets 11/11/2024 288  150 - 450 x10*3/uL Final      Physical Exam  Constitutional:       General: She is not in acute distress.     Appearance: She is ill-appearing.   HENT:      Nose: Congestion present.   Eyes:      Extraocular Movements: Extraocular movements intact.   Cardiovascular:      Rate and Rhythm: Normal rate and regular rhythm.   Pulmonary:      Effort: Pulmonary effort is normal.      Breath sounds: Normal breath sounds.   Abdominal:      General: Bowel sounds are normal.   Musculoskeletal:         General: Normal range of motion.      Cervical back: No rigidity.   Skin:     Findings: No rash.   Neurological:      General: No focal deficit present.      Mental Status: She is alert.   Psychiatric:         Thought Content: Thought content normal.         Assessment/Plan   Problem List Items Addressed This Visit             ICD-10-CM    Chronic kidney disease (CKD) stage G3a/A1, moderately decreased glomerular filtration rate (GFR) between 45-59 mL/min/1.73 square meter and albuminuria creatinine ratio less than 30 mg/g (CMS* (Multi) N18.31     Avoid nephrotoxic agents monitor renal function         Class 2 severe obesity with body mass index (BMI) of 35 to 39.9 with serious comorbidity E66.812, E66.01     Watch what you eat and include more vegetables on your plate.  Portion control and exercise will help in loosing weight .   It is recommended to get 150 mins of brisk exercise in a week . 150 mins of exercise per week will help in maintaining your current weight, if you are already working out . Exercise should make your heart rate go up.   Calorie deficit ( spending more calories than eating ) will result in weight loss    A deficit of 500 calories per day in 7 days would results in 1lbs weight loss per week., Aim for 1-2 lbs weight loss per month.    You can reduce the calorie intake by 250 calories and spend 250 calories by working out which would give you a total deficit of 500 calories     No sugary/ sweetened beverages , portion control , dedicated physical activity atleast 5 times a week advised .            Depression, major, single episode, mild (CMS-HCC) F32.0     Has been off medication and is doing well         Diabetes mellitus type 2, uncomplicated (Multi) E11.9     Continue medication tolerating Ozempic well A1c is improved at 5.6         Relevant Medications    metFORMIN (Glucophage) 500 mg tablet    semaglutide (OZEMPIC) 1 mg/dose (4 mg/3 mL) pen injector    Other Relevant Orders    Referral to Clinical Pharmacy    Dyslipidemia E78.5    Relevant Medications    rosuvastatin (Crestor) 10 mg tablet    HTN (hypertension) I10    Relevant Medications    amLODIPine (Norvasc) 10 mg tablet    chlorthalidone (Hygroton) 25 mg tablet    metoprolol succinate XL (Toprol-XL) 50 mg 24 hr tablet    olmesartan (BENIcar) 40 mg tablet    OAB (overactive bladder) N32.81    Relevant Medications    solifenacin (VESIcare) 10 mg tablet    Petechiae R23.3    Relevant Medications    fluocinonide (Lidex) 0.05 % ointment    Routine general medical examination at a health care facility - Primary Z00.00    Acute non-recurrent maxillary sinusitis J01.00    Relevant Medications    fluticasone (Flonase) 50 mcg/actuation nasal spray    Routine general medical examination at health care facility Z00.00    Relevant Orders    1 Year Follow Up In Primary Care - Wellness Exam    Type 2 diabetes mellitus with diabetic dermatitis, without long-term current use of insulin E11.620     Education provided re: diabetes pathophysiology and management, including effectiveness of diet, exercise, and medication in controlling sugar and preventing complications, as well as information on the complications and recommended care. I want your fasting morning blood sugar to be less than 100.   I want  your sugar to be less than 140 two hours after a meal.           Screening for cardiovascular condition Z13.6     I spent 15minutes face to face with this individual discussing their cardiovascular risk and behavioral therapies of nutritional choices, exercise, and elimination of habits contributing to risk.  We agreed on a plan how they may be able to reduce their current cardiovascular risk.  For patient with risk calculation >10%, Aspirin use was discussed and encouraged unless known allergy or increased risk of bleeding contraindicates use.    Patients 10 year CV risk estimate calculates:    %

## 2025-01-21 ENCOUNTER — APPOINTMENT (OUTPATIENT)
Dept: PHARMACY | Facility: HOSPITAL | Age: 65
End: 2025-01-21
Payer: MEDICARE

## 2025-01-31 NOTE — PROGRESS NOTES
Clinical Pharmacy Appointment    Patient ID: Twyla Mendoza is a 64 y.o. female who presents for No chief complaint on file..    Pt is here for First appointment.     Referring Provider: Adriel Hurtado MD  PCP: Adriel Hurtado MD   Last visit with PCP: 1/13/2025   Next visit with PCP: 4/14/2025      Subjective   HPI  DIABETES MELLITUS TYPE 2:    Diagnosed with diabetes:  ***. Known diabetic complications: ***.  Does patient follow with Endocrinology: {YES/NO:32709}  Last optometry exam: ***  Most recent visit in Podiatry: ***-- patient {DM reports/denies:22530} sores or cuts on feet today      Current diabetic medications include:  Metformin 500 mg; take 1 tablet BID  Ozempic 1 mg/dose; inject 1 mg once weekly on ***    Clarifications to above regimen: ***  Adverse Effects: ***    Past diabetic medications include:  Jardiance (allergy)    Glucose Readings:  Glucometer/CGM Type: ***  Patient tests BG *** times per day    Current home BG readings (mg/dL): ***   Previous home BG readings (mg/dL): ***    Any episodes of hypoglycemia? {YES/NO/NA:81133}.  Did patient treat episode of hypoglycemia appropriately? {YES/NO/NA:05424}  Does the patient have a prescription for ready-to-use Glucagon? Not on insulin    Lifestyle:  Diet: *** meals/day. ***  BK: ***  LN: ***  DN: ***  Snacks: ***  Drinks: ***  Physical Activity: ***  Tobacco history: ***    Secondary Prevention:  Statin? Yes  ACE-I/ARB? Yes  Aspirin? Yes    Pertinent PMH Review:  PMH of Pancreatitis: {YES,NO:49887}  PMH of Retinopathy: {YES,NO:32429}  PMH of Urinary Tract Infections: {YES,NO:52055}  PMH of MTC: {YES,NO:72971}  UACR/EGFR in last year?: Yes  Albumin/Creatinine Ratio   Date Value Ref Range Status   04/23/2024 40.0 (H) <30.0 ug/mg Creat Final     Albumin/Creatine Ratio   Date Value Ref Range Status   10/14/2022 85.7 (H) 0.0 - 30.0 ug/mg crt Final   07/14/2022 115.1 (H) 0.0 - 30.0 ug/mg crt Final     Immunizations:  Influenza? Yes  COVID?  {YES,NO:18848}  Pneumonia? {YES,NO:67463}  Shingles? {YES,NO:88850}  Hepatitis B? {YES,NO:36991}      Medication Reconciliation:  Changed:   Added:   Discontinued:     Drug Interactions  {Drug Interactions:41229}    Medication System Management  Patient's preferred pharmacy: ***  Adherence/Organization: ***  Affordability/Accessibility: ***      Objective   Allergies   Allergen Reactions    Empagliflozin Other     Social History     Social History Narrative    Not on file      Medication Review  Current Outpatient Medications   Medication Instructions    amLODIPine (NORVASC) 10 mg, oral, Daily    ascorbic acid, vitamin C, 500 mg capsule 1 tablet, Daily    aspirin 81 mg EC tablet Take by mouth.    chlorthalidone (HYGROTON) 25 mg, oral, Daily    cholecalciferol (Vitamin D-3) 5,000 Units tablet 1 tablet, Daily    fluocinonide (Lidex) 0.05 % ointment Topical, 2 times daily    fluticasone (Flonase) 50 mcg/actuation nasal spray 2 sprays, Each Nostril, Daily    magnesium gluconate (Magonate) 27.5 mg magne- sium (500 mg) tablet 1 tablet, Daily    melatonin 10 mg, oral, Nightly    metFORMIN (GLUCOPHAGE) 500 mg, oral, 2 times daily    metoprolol succinate XL (TOPROL-XL) 50 mg, oral, Daily    multivit-min-mfolate-K-dnck451 (Alive Women's 50 Plus Ultra) 800 mcg DFE- 150 mcg tablet 1 tablet, Daily    olmesartan (BENICAR) 40 mg, oral, Daily    OneTouch Ultra Test strip 2 times daily    polyethylene glycol-electrolytes (polyethylene glycol) 420 gram solution Take by mouth.    psyllium husk, aspartame, (Metamucil Sugar-Free, aspart,) 3.4 gram/5.8 gram powder 2 times daily    rosuvastatin (CRESTOR) 10 mg, oral, Daily    semaglutide (OZEMPIC) 1 mg, subcutaneous, Weekly    solifenacin (VESICARE) 10 mg, oral, Nightly    triamcinolone (Kenalog) 0.1 % cream apply and rub in a thin flim to affected areas twice daily (am and pm)      Vitals  BP Readings from Last 2 Encounters:   01/13/25 (!) 166/99   11/11/24 131/84     BMI Readings from  Last 1 Encounters:   01/13/25 36.98 kg/m²      Labs  A1C  Lab Results   Component Value Date    HGBA1C 6.1 (H) 11/11/2024    HGBA1C 5.6 07/30/2024    HGBA1C 5.8 04/23/2024     BMP  Lab Results   Component Value Date    CALCIUM 9.2 11/11/2024     11/11/2024    K 4.2 11/11/2024    CO2 30 11/11/2024     11/11/2024    BUN 16 11/11/2024    CREATININE 1.02 11/11/2024    EGFR 62 11/11/2024     LFTs  Lab Results   Component Value Date    ALT 16 11/11/2024    AST 15 11/11/2024    ALKPHOS 64 11/11/2024    BILITOT 0.3 11/11/2024     FLP  Lab Results   Component Value Date    TRIG 155 (H) 11/11/2024    CHOL 165 11/11/2024    LDLF 104 (H) 06/20/2023    LDLCALC 90 11/11/2024    HDL 44.0 11/11/2024     Urine Microalbumin  Lab Results   Component Value Date    MICROALBCREA 40.0 (H) 04/23/2024     Weight Management  Wt Readings from Last 3 Encounters:   01/13/25 101 kg (222 lb 3.2 oz)   11/11/24 99.4 kg (219 lb 3.2 oz)   08/30/24 93 kg (205 lb)      There is no height or weight on file to calculate BMI.     Patient's goal A1c is < 7%.  Is pt at goal? Yes, patient's most recent A1c from 11/11/2024 was 6.1%  Patient's SMBGs are ***.     Rationale for plan: ***.    Medication Changes:  CONTINUE    STOP    START    INCREASE    DECREASE      Future Considerations:      Monitoring and Education:        Assessment/Plan   Problem List Items Addressed This Visit    None      Clinical Pharmacist follow-up: ***, {In-Person / Telehealth:61607} visit    Continue all meds under the continuation of care with the referring provider and clinical pharmacy team.    Thank you,  Ceci Parker, PharmD  Clinical Pharmacist - Primary Care  [phone]  ***    Verbal consent to manage patient's drug therapy was obtained from {patient rights:61461}. They were informed they may decline to participate or withdraw from participation in pharmacy services at any time.   PharmD  Clinical Pharmacist - [facility]  [phone]  2/3/2025    Verbal consent to manage patient's drug therapy was obtained from the patient. They were informed they may decline to participate or withdraw from participation in pharmacy services at any time.

## 2025-02-03 ENCOUNTER — APPOINTMENT (OUTPATIENT)
Dept: PHARMACY | Facility: HOSPITAL | Age: 65
End: 2025-02-03
Payer: MEDICARE

## 2025-02-03 DIAGNOSIS — E11.9 TYPE 2 DIABETES MELLITUS WITHOUT COMPLICATION, UNSPECIFIED WHETHER LONG TERM INSULIN USE (MULTI): ICD-10-CM

## 2025-02-03 NOTE — ASSESSMENT & PLAN NOTE
Patient's goal A1c is < 7%.  Is pt at goal? Yes, patient's most recent A1c from 11/11/2024 was 6.1%  Patient does not check her blood sugars regularly, she is only taking Ozempic and metformin and is not experiencing hypoglycemic episodes.     Rationale for plan: Patient will continue with her current medications, she is well-controlled with her current A1c of 6.1%. She will apply for  PAP for cost assistance with Ozempic.    Medication Changes:  CONTINUE  Metformin 500 mg; take 1 tablet BID  Ozempic 1 mg/dose; inject 1 mg once weekly on Sundays    Future Considerations:  Will call patient with results of  PAP application    Monitoring and Education:  Counseled patient on goals for treatment (A1c <7%)  Counseled patient on the medication including dose, administration, adverse effects  Answered all patient questions and concerns

## 2025-02-21 NOTE — PROGRESS NOTES
Clinical Pharmacy Appointment    Patient ID: Twyla Mendoza is a 64 y.o. female who presents for Diabetes.    Pt is here for Follow Up appointment.     Referring Provider: Adriel Hurtado MD  PCP: Adriel Hurtado MD   Last visit with PCP: 1/13/2025   Next visit with PCP: 4/14/2025    Subjective   HPI  DIABETES MELLITUS TYPE 2:   Does patient follow with Endocrinology: No  Last optometry exam: June or July 2024  Most recent visit in Podiatry: checks feet regularly-- patient denies sores or cuts on feet today      Current diabetic medications include:  Metformin 500 mg; take 1 tablet BID  Ozempic 1 mg/dose; inject 1 mg once weekly on Sundays    Clarifications to above regimen: None  Adverse Effects: None    Past diabetic medications include:  Jardiance (allergy)    Glucose Readings:  Patient does not check her blood sugars regularly at home. She is only taking metformin and Ozempic and is not at risk for hypoglycemic episodes.    Lifestyle:  Diet: 2 meals/day.   BK: Quesada, sausage, eggs, toast, coffee  DN: Meat and vegetables  Snacks: Potato chips, popcorn  Drinks: Coffee, green tea, water  Exercise:  Nothing, patient cleans the house  Tobacco history: Non-smoker    Secondary Prevention:  Statin? Yes  ACE-I/ARB? Yes  Aspirin? Yes    Pertinent PMH Review:  PMH of Pancreatitis: No  PMH of Retinopathy: No  PMH of Urinary Tract Infections: No  PMH of MTC: No  PMH of MEN2: No  UACR/EGFR in last year?: Yes  Albumin/Creatinine Ratio   Date Value Ref Range Status   04/23/2024 40.0 (H) <30.0 ug/mg Creat Final     Albumin/Creatine Ratio   Date Value Ref Range Status   10/14/2022 85.7 (H) 0.0 - 30.0 ug/mg crt Final   07/14/2022 115.1 (H) 0.0 - 30.0 ug/mg crt Final     Immunizations:  Influenza? Yes  COVID? No  Pneumonia? No  Shingles? No    Drug Interactions  No relevant drug interactions were noted.    Medication System Management  Patient's preferred pharmacy: [facility]  Adherence/Organization: No concerns  at this time  Affordability/Accessibility: Ozempic is expensive, will apply for  PAP.      Objective   Allergies   Allergen Reactions    Empagliflozin Other     Social History     Social History Narrative    Not on file      Medication Review  Current Outpatient Medications   Medication Instructions    amLODIPine (NORVASC) 10 mg, oral, Daily    ascorbic acid, vitamin C, 500 mg capsule 1 tablet, Daily    aspirin 81 mg EC tablet Take by mouth.    chlorthalidone (HYGROTON) 25 mg, oral, Daily    cholecalciferol (Vitamin D-3) 5,000 Units tablet 1 tablet, Daily    fluocinonide (Lidex) 0.05 % ointment Topical, 2 times daily    fluticasone (Flonase) 50 mcg/actuation nasal spray 2 sprays, Each Nostril, Daily    magnesium gluconate (Magonate) 27.5 mg magne- sium (500 mg) tablet 1 tablet, Daily    melatonin 10 mg, oral, Nightly    metFORMIN (GLUCOPHAGE) 500 mg, oral, 2 times daily    metoprolol succinate XL (TOPROL-XL) 50 mg, oral, Daily    multivit-min-mfolate-K-fiyv684 (Alive Women's 50 Plus Ultra) 800 mcg DFE- 150 mcg tablet 1 tablet, Daily    olmesartan (BENICAR) 40 mg, oral, Daily    OneTouch Ultra Test strip 2 times daily    polyethylene glycol-electrolytes (polyethylene glycol) 420 gram solution Take by mouth.    psyllium husk, aspartame, (Metamucil Sugar-Free, aspart,) 3.4 gram/5.8 gram powder 2 times daily    rosuvastatin (CRESTOR) 10 mg, oral, Daily    semaglutide (OZEMPIC) 1 mg, subcutaneous, Weekly    solifenacin (VESICARE) 10 mg, oral, Nightly    triamcinolone (Kenalog) 0.1 % cream apply and rub in a thin flim to affected areas twice daily (am and pm)      Vitals  BP Readings from Last 2 Encounters:   01/13/25 (!) 166/99   11/11/24 131/84     BMI Readings from Last 1 Encounters:   01/13/25 36.98 kg/m²      Labs  A1C  Lab Results   Component Value Date    HGBA1C 6.1 (H) 11/11/2024    HGBA1C 5.6 07/30/2024    HGBA1C 5.8 04/23/2024     BMP  Lab Results   Component Value Date    CALCIUM 9.2 11/11/2024      11/11/2024    K 4.2 11/11/2024    CO2 30 11/11/2024     11/11/2024    BUN 16 11/11/2024    CREATININE 1.02 11/11/2024    EGFR 62 11/11/2024     LFTs  Lab Results   Component Value Date    ALT 16 11/11/2024    AST 15 11/11/2024    ALKPHOS 64 11/11/2024    BILITOT 0.3 11/11/2024     FLP  Lab Results   Component Value Date    TRIG 155 (H) 11/11/2024    CHOL 165 11/11/2024    LDLF 104 (H) 06/20/2023    LDLCALC 90 11/11/2024    HDL 44.0 11/11/2024     Urine Microalbumin  Lab Results   Component Value Date    MICROALBCREA 40.0 (H) 04/23/2024     Weight Management  Wt Readings from Last 3 Encounters:   01/13/25 101 kg (222 lb 3.2 oz)   11/11/24 99.4 kg (219 lb 3.2 oz)   08/30/24 93 kg (205 lb)      There is no height or weight on file to calculate BMI.    Assessment/Plan   Problem List Items Addressed This Visit       Diabetes mellitus type 2, uncomplicated (Multi)     Patient's goal A1c is < 7%.  Is pt at goal? Yes, patient's most recent A1c from 11/11/2024 was 6.1%  Patient does not check her blood sugars regularly, she is only taking Ozempic and metformin and is not experiencing hypoglycemic episodes.     Rationale for plan: Patient will continue with her current medications, she is well-controlled with her current A1c of 6.1%.  She will apply for  [employer] for cost assistance for Ozempic.    Medication Changes:  CONTINUE  Metformin 500 mg; take 1 tablet BID  Ozempic 1 mg/dose; inject 1 mg once weekly on Sundays    Future Considerations:  Will call patient with results of  PAP application    Monitoring and Education:  Counseled patient on goals for treatment (A1c <7%)  Counseled patient on the medication including dose, administration, adverse effects  Answered all patient questions and concerns            Clinical Pharmacist follow-up: 3/17/2025, Telehealth visit    Patient is not followed in CCM. (If yes, track minutes under compass leeroy at each visit)    Continue all meds under the continuation of care with the  referring provider and clinical pharmacy team.    Thank you,  Ceci Parker, PharmD  Clinical Pharmacist - Primary Care  [phone]  2/24/2025    Verbal consent to manage patient's drug therapy was obtained from the patient. They were informed they may decline to participate or withdraw from participation in pharmacy services at any time.

## 2025-02-24 ENCOUNTER — APPOINTMENT (OUTPATIENT)
Dept: PHARMACY | Facility: HOSPITAL | Age: 65
End: 2025-02-24
Payer: MEDICARE

## 2025-02-24 DIAGNOSIS — E11.9 TYPE 2 DIABETES MELLITUS WITHOUT COMPLICATION, UNSPECIFIED WHETHER LONG TERM INSULIN USE (MULTI): ICD-10-CM

## 2025-02-24 NOTE — ASSESSMENT & PLAN NOTE
Patient's goal A1c is < 7%.  Is pt at goal? Yes, patient's most recent A1c from 11/11/2024 was 6.1%  Patient does not check her blood sugars regularly, she is only taking Ozempic and metformin and is not experiencing hypoglycemic episodes.     Rationale for plan: Patient will continue with her current medications, she is well-controlled with her current A1c of 6.1%.  She will apply for  PAP for cost assistance for Ozempic.    Medication Changes:  CONTINUE  Metformin 500 mg; take 1 tablet BID  Ozempic 1 mg/dose; inject 1 mg once weekly on Sundays    Future Considerations:  Will call patient with results of  PAP application    Monitoring and Education:  Counseled patient on goals for treatment (A1c <7%)  Counseled patient on the medication including dose, administration, adverse effects  Answered all patient questions and concerns

## 2025-03-14 NOTE — PROGRESS NOTES
Clinical Pharmacy Appointment    Patient ID: Twyla Mendoza is a 64 y.o. female who presents for Diabetes.    Pt is here for Follow Up appointment.     Referring Provider: Adriel Hurtado MD  PCP: Adriel Hurtado MD   Last visit with PCP: 1/13/2025   Next visit with PCP: 4/14/2025    Subjective   HPI  DIABETES MELLITUS TYPE 2:    Does patient follow with Endocrinology: No  Last optometry exam: June or July 2024  Most recent visit in Podiatry: checks feet regularly     Current diabetic medications include:  Metformin 500 mg; take 1 tablet BID  Ozempic 1 mg/dose; inject 1 mg once weekly on Sundays    Clarifications to above regimen: None  Adverse Effects: None    Past diabetic medications include:  Jardiance (allergy)    Glucose Readings:  Patient does not check her blood sugars regularly at home. She is only taking metformin and Ozempic and is not at high risk for hypoglycemic episodes.    Lifestyle:  Diet: 2 meals/day.   BK: Quesada, sausage, eggs, toast, coffee  DN: Meat and vegetables  Snacks: Potato chips, popcorn  Drinks: Coffee, green tea, water  Exercise: Nothing, patient wants to start exercising since it is getting nicer outside  Tobacco history: Non-smoker    Secondary Prevention:  Statin? Yes  ACE-I/ARB? Yes  Aspirin? Yes    Pertinent PMH Review:  PMH of Pancreatitis: No  PMH of Retinopathy: No  PMH of Urinary Tract Infections: No  PMH of MTC: No  PMH of MEN2: No  UACR/EGFR in last year?: Yes  Albumin/Creatinine Ratio   Date Value Ref Range Status   04/23/2024 40.0 (H) <30.0 ug/mg Creat Final     Albumin/Creatine Ratio   Date Value Ref Range Status   10/14/2022 85.7 (H) 0.0 - 30.0 ug/mg crt Final   07/14/2022 115.1 (H) 0.0 - 30.0 ug/mg crt Final     Immunizations:  Influenza? Yes  COVID? No  Pneumonia? No  Shingles? No     Drug Interactions  No relevant drug interactions were noted.    Medication System Management  Patient's preferred pharmacy: [facility]   Adherence/Organization: No  concerns at this time      Objective   Allergies   Allergen Reactions    Empagliflozin Other     Social History     Social History Narrative    Not on file      Medication Review  Current Outpatient Medications   Medication Instructions    amLODIPine (NORVASC) 10 mg, oral, Daily    ascorbic acid, vitamin C, 500 mg capsule 1 tablet, Daily    aspirin 81 mg EC tablet Take by mouth.    chlorthalidone (HYGROTON) 25 mg, oral, Daily    cholecalciferol (Vitamin D-3) 5,000 Units tablet 1 tablet, Daily    fluocinonide (Lidex) 0.05 % ointment Topical, 2 times daily    fluticasone (Flonase) 50 mcg/actuation nasal spray 2 sprays, Each Nostril, Daily    magnesium gluconate (Magonate) 27.5 mg magne- sium (500 mg) tablet 1 tablet, Daily    melatonin 10 mg, oral, Nightly    metFORMIN (GLUCOPHAGE) 500 mg, oral, 2 times daily    metoprolol succinate XL (TOPROL-XL) 50 mg, oral, Daily    multivit-min-mfolate-K-lmfv753 (Alive Women's 50 Plus Ultra) 800 mcg DFE- 150 mcg tablet 1 tablet, Daily    olmesartan (BENICAR) 40 mg, oral, Daily    OneTouch Ultra Test strip 2 times daily    polyethylene glycol-electrolytes (polyethylene glycol) 420 gram solution Take by mouth.    psyllium husk, aspartame, (Metamucil Sugar-Free, aspart,) 3.4 gram/5.8 gram powder 2 times daily    rosuvastatin (CRESTOR) 10 mg, oral, Daily    semaglutide (OZEMPIC) 1 mg, subcutaneous, Weekly    solifenacin (VESICARE) 10 mg, oral, Nightly    triamcinolone (Kenalog) 0.1 % cream apply and rub in a thin flim to affected areas twice daily (am and pm)      Vitals  BP Readings from Last 2 Encounters:   01/13/25 (!) 166/99   11/11/24 131/84     BMI Readings from Last 1 Encounters:   01/13/25 36.98 kg/m²      Labs  A1C  Lab Results   Component Value Date    HGBA1C 6.1 (H) 11/11/2024    HGBA1C 5.6 07/30/2024    HGBA1C 5.8 04/23/2024     BMP  Lab Results   Component Value Date    CALCIUM 9.2 11/11/2024     11/11/2024    K 4.2 11/11/2024    CO2 30 11/11/2024      11/11/2024    BUN 16 11/11/2024    CREATININE 1.02 11/11/2024    EGFR 62 11/11/2024     LFTs  Lab Results   Component Value Date    ALT 16 11/11/2024    AST 15 11/11/2024    ALKPHOS 64 11/11/2024    BILITOT 0.3 11/11/2024     FLP  Lab Results   Component Value Date    TRIG 155 (H) 11/11/2024    CHOL 165 11/11/2024    LDLF 104 (H) 06/20/2023    LDLCALC 90 11/11/2024    HDL 44.0 11/11/2024     Urine Microalbumin  Lab Results   Component Value Date    MICROALBCREA 40.0 (H) 04/23/2024     Weight Management  Wt Readings from Last 3 Encounters:   01/13/25 101 kg (222 lb 3.2 oz)   11/11/24 99.4 kg (219 lb 3.2 oz)   08/30/24 93 kg (205 lb)      There is no height or weight on file to calculate BMI.     Assessment/Plan   Problem List Items Addressed This Visit       Diabetes mellitus type 2, uncomplicated (Multi)     Patient's goal A1c is < 7%.  Is pt at goal? Yes, patient's most recent A1c from 11/11/2024 was 6.1%  Patient does not regularly check her blood sugars at home, she is not having any hypoglycemic events and is only taking Ozempic and metformin.     Rationale for plan: Patient is well controlled with her current medications. Patient has not been able to get her 's financial information for  PAP, will try to apply for [employer]'s PAP instead.    Medication Changes:  CONTINUE  Metformin 500 mg; take 1 tablet BID  Ozempic 1 mg/dose; inject 1 mg once weekly on Sundays    Future Considerations:  Patient will be due for an updated A1c when she has her appointment with Dr. Hurtado on 4/14/2025    Monitoring and Education:  Counseled patient on medications  Answered all patient questions and concerns         Relevant Orders    Referral to Clinical Pharmacy       Clinical Pharmacist follow-up: 4/21/2025, Telehealth visit    Patient is not followed in CCM. (If yes, track minutes under compass leeroy at each visit)    Continue all meds under the continuation of care with the referring provider and clinical pharmacy  team.    Thank you,  Ceci Parker, PharmD  Clinical Pharmacist - Primary Care  [phone]  3/17/2025    Verbal consent to manage patient's drug therapy was obtained from the patient. They were informed they may decline to participate or withdraw from participation in pharmacy services at any time.

## 2025-03-17 ENCOUNTER — APPOINTMENT (OUTPATIENT)
Dept: PHARMACY | Facility: HOSPITAL | Age: 65
End: 2025-03-17
Payer: MEDICARE

## 2025-03-17 DIAGNOSIS — E11.9 TYPE 2 DIABETES MELLITUS WITHOUT COMPLICATION, UNSPECIFIED WHETHER LONG TERM INSULIN USE (MULTI): ICD-10-CM

## 2025-03-17 NOTE — ASSESSMENT & PLAN NOTE
Patient's goal A1c is < 7%.  Is pt at goal? Yes, patient's most recent A1c from 11/11/2024 was 6.1%  Patient does not regularly check her blood sugars at home, she is not having any hypoglycemic events and is only taking Ozempic and metformin.     Rationale for plan: Patient is well controlled with her current medications. Patient has not been able to get her 's financial information for  PAP, will try to apply for [employer]'s PAP instead.    Medication Changes:  CONTINUE  Metformin 500 mg; take 1 tablet BID  Ozempic 1 mg/dose; inject 1 mg once weekly on Sundays    Future Considerations:  Patient will be due for an updated A1c when she has her appointment with Dr. Hurtado on 4/14/2025    Monitoring and Education:  Counseled patient on medications  Answered all patient questions and concerns

## 2025-04-04 ENCOUNTER — TELEPHONE (OUTPATIENT)
Dept: PHARMACY | Facility: HOSPITAL | Age: 65
End: 2025-04-04
Payer: MEDICARE

## 2025-04-04 NOTE — TELEPHONE ENCOUNTER
Patient Assistance Program Approval:     We are pleased to inform you that your application for assistance has been approved.     This approval is valid through April 4, 2026  as long as the following criteria continue to be satisfied:     Your medication (Ozempic) remains covered under your current insurance plan.   Your prescriber does not discontinue therapy.   You do not seek reimbursement from any other private or government-funded programs for the  medication.    Under this program, the pharmacy will first bill your insurance plan for your indemnified specified medication. The [employer] will then offset your copay balance, so that your out-of pocket expense for your specialty medication will be $0.00.    Ceci Parker, PharmD

## 2025-04-14 ENCOUNTER — APPOINTMENT (OUTPATIENT)
Dept: PRIMARY CARE | Facility: CLINIC | Age: 65
End: 2025-04-14
Payer: MEDICARE

## 2025-04-14 DIAGNOSIS — E11.9 TYPE 2 DIABETES MELLITUS WITHOUT COMPLICATION, WITHOUT LONG-TERM CURRENT USE OF INSULIN: Primary | ICD-10-CM

## 2025-04-14 DIAGNOSIS — E78.5 DYSLIPIDEMIA: ICD-10-CM

## 2025-04-14 DIAGNOSIS — E66.01 CLASS 2 SEVERE OBESITY WITH BODY MASS INDEX (BMI) OF 35 TO 39.9 WITH SERIOUS COMORBIDITY: ICD-10-CM

## 2025-04-14 DIAGNOSIS — I10 PRIMARY HYPERTENSION: ICD-10-CM

## 2025-04-14 DIAGNOSIS — E66.812 CLASS 2 SEVERE OBESITY WITH BODY MASS INDEX (BMI) OF 35 TO 39.9 WITH SERIOUS COMORBIDITY: ICD-10-CM

## 2025-04-14 LAB — POC HEMOGLOBIN A1C: 6.2 % (ref 4.2–6.5)

## 2025-04-14 PROCEDURE — 1160F RVW MEDS BY RX/DR IN RCRD: CPT | Performed by: FAMILY MEDICINE

## 2025-04-14 PROCEDURE — 3008F BODY MASS INDEX DOCD: CPT | Performed by: FAMILY MEDICINE

## 2025-04-14 PROCEDURE — 4010F ACE/ARB THERAPY RXD/TAKEN: CPT | Performed by: FAMILY MEDICINE

## 2025-04-14 PROCEDURE — 83036 HEMOGLOBIN GLYCOSYLATED A1C: CPT | Performed by: FAMILY MEDICINE

## 2025-04-14 PROCEDURE — 1159F MED LIST DOCD IN RCRD: CPT | Performed by: FAMILY MEDICINE

## 2025-04-14 PROCEDURE — 99214 OFFICE O/P EST MOD 30 MIN: CPT | Performed by: FAMILY MEDICINE

## 2025-04-14 PROCEDURE — 1123F ACP DISCUSS/DSCN MKR DOCD: CPT | Performed by: FAMILY MEDICINE

## 2025-04-14 PROCEDURE — 3044F HG A1C LEVEL LT 7.0%: CPT | Performed by: FAMILY MEDICINE

## 2025-04-14 PROCEDURE — 1036F TOBACCO NON-USER: CPT | Performed by: FAMILY MEDICINE

## 2025-04-14 PROCEDURE — 3075F SYST BP GE 130 - 139MM HG: CPT | Performed by: FAMILY MEDICINE

## 2025-04-14 PROCEDURE — 3079F DIAST BP 80-89 MM HG: CPT | Performed by: FAMILY MEDICINE

## 2025-04-14 PROCEDURE — G2211 COMPLEX E/M VISIT ADD ON: HCPCS | Performed by: FAMILY MEDICINE

## 2025-04-16 LAB
ALBUMIN/CREAT UR: 246 MG/G CREAT
CREAT UR-MCNC: 172 MG/DL (ref 20–275)
MICROALBUMIN UR-MCNC: 42.3 MG/DL

## 2025-04-17 NOTE — RESULT ENCOUNTER NOTE
Please advise patient urine protein improved compared to last year but still present Control BP and sugar .

## 2025-04-20 VITALS
BODY MASS INDEX: 37.82 KG/M2 | HEART RATE: 73 BPM | DIASTOLIC BLOOD PRESSURE: 88 MMHG | WEIGHT: 227 LBS | HEIGHT: 65 IN | SYSTOLIC BLOOD PRESSURE: 138 MMHG

## 2025-04-20 ASSESSMENT — ENCOUNTER SYMPTOMS
VOMITING: 0
HEMATOLOGIC/LYMPHATIC NEGATIVE: 1
NAUSEA: 0
FEVER: 0
ALLERGIC/IMMUNOLOGIC NEGATIVE: 1
BACK PAIN: 1
NEUROLOGICAL NEGATIVE: 1
GASTROINTESTINAL NEGATIVE: 1
ARTHRALGIAS: 1
ENDOCRINE NEGATIVE: 1
CHILLS: 0
PSYCHIATRIC NEGATIVE: 1
RESPIRATORY NEGATIVE: 1
CARDIOVASCULAR NEGATIVE: 1

## 2025-04-20 NOTE — PROGRESS NOTES
"Subjective   Reason for Visit: Twyla Mendoza is an 64 y.o. female here for follow up of hypertension diabetes hyperlipidemia A1c is well-controlled blood pressure is well-controlled has lost weight feels better    Past Medical, Surgical, and Family History reviewed and updated in chart.    Reviewed all medications by prescribing practitioner or clinical pharmacist (such as prescriptions, OTCs, herbal therapies and supplements) and documented in the medical record.    HPI  Patient is here for follow-up of diabetes hypertension hyperlipidemia taking meds as prescribed tolerating statin is starting Ozempic thru PAP . A1c 6.2 /   Patient Care Team:  Adriel Hurtado MD as PCP - General  Adriel Hurtado MD as PCP - [employer] PCP  Saleem CollierD as Pharmacist (Pharmacy)     Review of Systems   Constitutional:  Negative for chills and fever.   HENT: Negative.     Respiratory: Negative.     Cardiovascular: Negative.    Gastrointestinal: Negative.  Negative for nausea and vomiting.   Endocrine: Negative.    Genitourinary: Negative.    Musculoskeletal:  Positive for arthralgias and back pain.   Skin: Negative.  Negative for rash.   Allergic/Immunologic: Negative.    Neurological: Negative.    Hematological: Negative.    Psychiatric/Behavioral: Negative.     All other systems reviewed and are negative.      Objective   Vitals:  BP (!) 151/99   Pulse 73   Ht 1.651 m (5' 5\")   Wt 103 kg (227 lb)   LMP  (LMP Unknown) Comment: hysterectomy at 41  BMI 37.77 kg/m²       Physical Exam  Constitutional:       Appearance: Normal appearance.   HENT:      Right Ear: Tympanic membrane normal.      Left Ear: Tympanic membrane normal.   Cardiovascular:      Rate and Rhythm: Normal rate and regular rhythm.   Pulmonary:      Effort: Pulmonary effort is normal.      Breath sounds: Normal breath sounds.   Abdominal:      General: Bowel sounds are normal.   Neurological:      General: No focal deficit present.      " Mental Status: She is alert.   Psychiatric:         Mood and Affect: Mood normal.         Assessment/Plan   Problem List Items Addressed This Visit       Class 2 severe obesity with body mass index (BMI) of 35 to 39.9 with serious comorbidity    Current Assessment & Plan   Watch what you eat and include more vegetables on your plate.  Portion control and exercise will help in loosing weight .   It is recommended to get 150 mins of brisk exercise in a week . 150 mins of exercise per week will help in maintaining your current weight, if you are already working out . Exercise should make your heart rate go up.   Calorie deficit ( spending more calories than eating ) will result in weight loss    A deficit of 500 calories per day in 7 days would results in 1lbs weight loss per week., Aim for 1-2 lbs weight loss per month.   You can reduce the calorie intake by 250 calories and spend 250 calories by working out which would give you a total deficit of 500 calories     No sugary/ sweetened beverages , portion control , dedicated physical activity atleast 5 times a week advised .            Diabetes mellitus type 2, uncomplicated - Primary    Relevant Medications    semaglutide 0.25 mg or 0.5 mg (2 mg/3 mL) pen injector    Other Relevant Orders    POCT glycosylated hemoglobin (Hb A1C) manually resulted (Completed)    Albumin-Creatinine Ratio, Urine Random (Completed)    Dyslipidemia    Current Assessment & Plan   Continue rosuvastatin patient is tolerating well he is eating healthier         HTN (hypertension)    Current Assessment & Plan   Continue losartan blood pressure is well-controlled will monitor urine albumin        patient is advised to lose weight by reducing calorie intake and increasing activity levels, especially aerobic exercise 15 minutes spent on obesity counseling

## 2025-04-21 ENCOUNTER — APPOINTMENT (OUTPATIENT)
Dept: PHARMACY | Facility: HOSPITAL | Age: 65
End: 2025-04-21
Payer: MEDICARE

## 2025-04-21 DIAGNOSIS — E11.9 TYPE 2 DIABETES MELLITUS WITHOUT COMPLICATION, UNSPECIFIED WHETHER LONG TERM INSULIN USE: ICD-10-CM

## 2025-04-21 DIAGNOSIS — E11.9 TYPE 2 DIABETES MELLITUS WITHOUT COMPLICATION, WITHOUT LONG-TERM CURRENT USE OF INSULIN: ICD-10-CM

## 2025-04-21 PROCEDURE — RXMED WILLOW AMBULATORY MEDICATION CHARGE

## 2025-04-21 NOTE — ASSESSMENT & PLAN NOTE
Patient's goal A1c is < 7%.  Is pt at goal? Yes, patient's most recent A1c from 4/14/2025 was 6.2%  Patient does not check her blood sugar at home regularly, she is not having any hypoglycemic events and is only taking Ozempic and metformin.     Rationale for plan: Since patient has not had any Ozempic for a while, will restart at the lowest dose and re-titrate up as needed.    Medication Changes:  CONTINUE  Metformin 500 mg; take 1 tablet BID  START  Ozempic 0.25 mg or 0.5 mg; inject 0.25 mg once weekly for 4 weeks and then increase to 0.5 mg    Monitoring and Education:  Answered all patient questions and concerns

## 2025-04-21 NOTE — PROGRESS NOTES
Clinical Pharmacy Appointment    Patient ID: Twyla Mendoza is a 64 y.o. female who presents for Diabetes.    Pt is here for Follow Up appointment.     Referring Provider: Adriel Hurtado MD  PCP: Adriel Hurtado MD   Last visit with PCP: 4/14/2025   Next visit with PCP: not scheduled    Subjective   Drug Interactions  No relevant drug interactions were noted.    Medication System Management  Patient's preferred pharmacy:  [facility]  Adherence/Organization: No concerns at this time  Affordability/Accessibility: Ozempic approved on  PAP through 4/4/2026      HPI  DIABETES MELLITUS TYPE 2:    Does patient follow with Endocrinology: No  Last optometry exam: June or July 2024  Most recent visit in Podiatry: checks feet regularly -- patient denies sores or cuts on feet today      Current diabetic medications include:  Metformin 500 mg; take 1 tablet BID  Ozempic 1 mg/dose; inject 1 mg once weekly on Sundays    Clarifications to above regimen: patient has not gotten the Ozempic from  Home delivery, has not taken for a couple months.  Adverse Effects: None    Past diabetic medications include:  Jardiance (allergy)    Glucose Readings:  Patient does not check her blood sugar regularly at home. She is only taking metformin and Ozempic and is not at high risk for hypoglycemic episodes.    Lifestyle:  Diet: 2 meals/day.   BK: Quesada, sausage, eggs, toast, coffee  DN: meat and vegetables  Snacks: potato chips, popcorn  Drinks: coffee, green tea, water  Exercise: Chair yoga  Tobacco history: Non-smoker    Secondary Prevention:  Statin? Yes  ACE-I/ARB? Yes  Aspirin? Yes    Pertinent PMH Review:  PMH of Pancreatitis: No  PMH of Retinopathy: No  PMH of Urinary Tract Infections: No  PMH of MTC: No  PMH of MEN2: No  UACR/EGFR in last year?: Yes  ALBUMIN/CREATININE RATIO, RANDOM URINE   Date Value Ref Range Status   04/14/2025 246 (H) <30 mg/g creat Final     Comment:        The ADA defines  abnormalities in albumin  excretion as follows:     Albuminuria Category        Result (mg/g creatinine)     Normal to Mildly increased   <30  Moderately increased            Severely increased           > OR = 300     The ADA recommends that at least two of three  specimens collected within a 3-6 month period be  abnormal before considering a patient to be  within a diagnostic category.       Albumin/Creatinine Ratio   Date Value Ref Range Status   04/23/2024 40.0 (H) <30.0 ug/mg Creat Final     Albumin/Creatine Ratio   Date Value Ref Range Status   10/14/2022 85.7 (H) 0.0 - 30.0 ug/mg crt Final     Immunizations:  Influenza? Yes  COVID? No  Pneumonia? No  Shingles? No      Objective   Allergies[1]  Social History     Social History Narrative    Not on file      Medication Review  Current Outpatient Medications   Medication Instructions    amLODIPine (NORVASC) 10 mg, oral, Daily    ascorbic acid, vitamin C, 500 mg capsule 1 tablet, Daily    aspirin 81 mg EC tablet Take by mouth.    chlorthalidone (HYGROTON) 25 mg, oral, Daily    cholecalciferol (Vitamin D-3) 5,000 Units tablet 1 tablet, Daily    fluocinonide (Lidex) 0.05 % ointment Topical, 2 times daily    fluticasone (Flonase) 50 mcg/actuation nasal spray 2 sprays, Each Nostril, Daily    magnesium gluconate (Magonate) 27.5 mg magne- sium (500 mg) tablet 1 tablet, Daily    melatonin 10 mg, oral, Nightly    metFORMIN (GLUCOPHAGE) 500 mg, oral, 2 times daily    metoprolol succinate XL (TOPROL-XL) 50 mg, oral, Daily    multivit-min-mfolate-[id number] (Alive Women's 50 Plus Ultra) 800 mcg DFE- 150 mcg tablet 1 tablet, Daily    olmesartan (BENICAR) 40 mg, oral, Daily    OneTouch Ultra Test strip 2 times daily    polyethylene glycol-electrolytes (polyethylene glycol) 420 gram solution Take by mouth.    psyllium husk, aspartame, (Metamucil Sugar-Free, aspart,) 3.4 gram/5.8 gram powder 2 times daily    rosuvastatin (CRESTOR) 10 mg, oral, Daily    semaglutide 0.25 mg,  subcutaneous, Once Weekly, For 4 weeks, then increase to 0.5 mg weekly.    solifenacin (VESICARE) 10 mg, oral, Nightly    triamcinolone (Kenalog) 0.1 % cream apply and rub in a thin flim to affected areas twice daily (am and pm)      Vitals  BP Readings from Last 2 Encounters:   04/14/25 138/88   01/13/25 (!) 166/99     BMI Readings from Last 1 Encounters:   04/14/25 37.77 kg/m²      Labs  A1C  Lab Results   Component Value Date    HGBA1C 6.2 04/14/2025    HGBA1C 6.1 (H) 11/11/2024    HGBA1C 5.6 07/30/2024     BMP  Lab Results   Component Value Date    CALCIUM 9.2 11/11/2024     11/11/2024    K 4.2 11/11/2024    CO2 30 11/11/2024     11/11/2024    BUN 16 11/11/2024    CREATININE 1.02 11/11/2024    EGFR 62 11/11/2024     LFTs  Lab Results   Component Value Date    ALT 16 11/11/2024    AST 15 11/11/2024    ALKPHOS 64 11/11/2024    BILITOT 0.3 11/11/2024     FLP  Lab Results   Component Value Date    TRIG 155 (H) 11/11/2024    CHOL 165 11/11/2024    LDLF 104 (H) 06/20/2023    LDLCALC 90 11/11/2024    HDL 44.0 11/11/2024     Urine Microalbumin  Lab Results   Component Value Date    MICROALBCREA 246 (H) 04/14/2025     Weight Management  Wt Readings from Last 3 Encounters:   04/14/25 103 kg (227 lb)   01/13/25 101 kg (222 lb 3.2 oz)   11/11/24 99.4 kg (219 lb 3.2 oz)      There is no height or weight on file to calculate BMI.     Assessment/Plan   Problem List Items Addressed This Visit       Diabetes mellitus type 2, uncomplicated    Patient's goal A1c is < 7%.  Is pt at goal? Yes, patient's most recent A1c from 4/14/2025 was 6.2%  Patient does not check her blood sugar at home regularly, she is not having any hypoglycemic events and is only taking Ozempic and metformin.     Rationale for plan: Since patient has not had any Ozempic for a while, will restart at the lowest dose and re-titrate up as needed.    Medication Changes:  CONTINUE  Metformin 500 mg; take 1 tablet BID  START  Ozempic 0.25 mg or 0.5 mg;  inject 0.25 mg once weekly for 4 weeks and then increase to 0.5 mg    Monitoring and Education:  Answered all patient questions and concerns         Relevant Medications    semaglutide 0.25 mg or 0.5 mg (2 mg/3 mL) pen injector    Other Relevant Orders    Referral to Clinical Pharmacy    Referral to Clinical Pharmacy       Clinical Pharmacist follow-up: 5/19/2025, Telehealth visit    Patient is not followed in [facility]. (If yes, track minutes under compass leeroy at each visit)    Continue all meds under the continuation of care with the referring provider and clinical pharmacy team.    Thank you,  Ceci Parker, PharmD  Clinical Pharmacist - Primary Care  [phone]  4/21/2025    Verbal consent to manage patient's drug therapy was obtained from the patient. They were informed they may decline to participate or withdraw from participation in pharmacy services at any time.         [1]   Allergies  Allergen Reactions    Empagliflozin Other

## 2025-04-23 DIAGNOSIS — E78.5 DYSLIPIDEMIA: ICD-10-CM

## 2025-04-23 DIAGNOSIS — I10 PRIMARY HYPERTENSION: ICD-10-CM

## 2025-04-23 RX ORDER — METOPROLOL SUCCINATE 50 MG/1
50 TABLET, EXTENDED RELEASE ORAL DAILY
Qty: 90 TABLET | Refills: 3 | Status: SHIPPED | OUTPATIENT
Start: 2025-04-23

## 2025-04-23 RX ORDER — ROSUVASTATIN CALCIUM 10 MG/1
10 TABLET, COATED ORAL DAILY
Qty: 90 TABLET | Refills: 3 | Status: SHIPPED | OUTPATIENT
Start: 2025-04-23

## 2025-04-24 ENCOUNTER — PHARMACY VISIT (OUTPATIENT)
Dept: PHARMACY | Facility: CLINIC | Age: 65
End: 2025-04-24
Payer: COMMERCIAL

## 2025-05-12 NOTE — PROGRESS NOTES
Clinical Pharmacy Appointment    Patient ID: Twyla Mendoza is a 64 y.o. female who presents for Diabetes.    Pt is here for Follow Up appointment.     Referring Provider: Adriel Hurtado MD  PCP: Adriel Hurtado MD  Last visit with PCP: 4/14/2025   Next visit with PCP: not scheduled    Subjective   Drug Interactions  No relevant drug interactions were noted.    Medication System Management  Patient's [facility]  Adherence/Organization: No concerns at this time  Affordability/Accessibility: Ozempic approved on  PAP through 4/4/2026      HPI  DIABETES MELLITUS TYPE 2:    Does patient follow with Endocrinology: No  Last optometry exam: June/July 2024  Most recent visit in Podiatry: checks feet regularly -- patient denies sores or cuts on feet today      Current diabetic medications include:  Metformin 500 mg; take 1 tablet BID  Ozempic 0.25 mg or 0.5 mg/dose; inject 0.25 mg once weekly for 4 weeks on Sundays then increase to 0.5 mg weekly    Clarifications to above regimen: None  Adverse Effects: None    Past diabetic medications include:  Jardiance (allergy)    Glucose Readings:  Patient does not check her blood sugar regularly at home. She is only taking metformin and Ozempic and is not at high risk for hypoglycemic episodes.    Lifestyle:  Diet: 2 meals/day.   BK: Quesada, sausage, eggs, toast, coffee  DN: meat and vegetables  Snacks: potato chips, popcorn  Drinks: coffee, green tea, water  Exercise:  Chair yoga twice a week  Tobacco history: Non-smoker    Secondary Prevention:  Statin? Yes  ACE-I/ARB? Yes  Aspirin? Yes    Pertinent PMH Review:  PMH of Pancreatitis: No  PMH of Retinopathy: No  PMH of Urinary Tract Infections: No  PMH of MTC: No  PMH of MEN2: No  UACR/EGFR in last year?: Yes  ALBUMIN/CREATININE RATIO, RANDOM URINE   Date Value Ref Range Status   04/14/2025 246 (H) <30 mg/g creat Final     Comment:        The ADA defines abnormalities in  albumin  excretion as follows:     Albuminuria Category        Result (mg/g creatinine)     Normal to Mildly increased   <30  Moderately increased            Severely increased           > OR = 300     The ADA recommends that at least two of three  specimens collected within a 3-6 month period be  abnormal before considering a patient to be  within a diagnostic category.       Albumin/Creatinine Ratio   Date Value Ref Range Status   04/23/2024 40.0 (H) <30.0 ug/mg Creat Final     Albumin/Creatine Ratio   Date Value Ref Range Status   10/14/2022 85.7 (H) 0.0 - 30.0 ug/mg crt Final     Immunizations:  Influenza? Yes  COVID? No  Pneumonia? No  Shingles? No      Objective   Allergies[1]  Social History     Social History Narrative    Not on file      Medication Review  Current Outpatient Medications   Medication Instructions    amLODIPine (NORVASC) 10 mg, oral, Daily    ascorbic acid, vitamin C, 500 mg capsule 1 tablet, Daily    aspirin 81 mg EC tablet Take by mouth.    chlorthalidone (HYGROTON) 25 mg, oral, Daily    cholecalciferol (Vitamin D-3) 5,000 Units tablet 1 tablet, Daily    fluocinonide (Lidex) 0.05 % ointment Topical, 2 times daily    fluticasone (Flonase) 50 mcg/actuation nasal spray 2 sprays, Each Nostril, Daily    magnesium gluconate (Magonate) 27.5 mg magne- sium (500 mg) tablet 1 tablet, Daily    melatonin 10 mg, oral, Nightly    metFORMIN (GLUCOPHAGE) 500 mg, oral, 2 times daily    metoprolol succinate XL (TOPROL-XL) 50 mg, oral, Daily    multivit-min-mfolate-[id number] (Alive Women's 50 Plus Ultra) 800 mcg DFE- 150 mcg tablet 1 tablet, Daily    olmesartan (BENICAR) 40 mg, oral, Daily    OneTouch Ultra Test strip 2 times daily    polyethylene glycol-electrolytes (polyethylene glycol) 420 gram solution Take by mouth.    psyllium husk, aspartame, (Metamucil Sugar-Free, aspart,) 3.4 gram/5.8 gram powder 2 times daily    rosuvastatin (CRESTOR) 10 mg, oral, Daily    semaglutide 0.5 mg, subcutaneous,  Once Weekly    solifenacin (VESICARE) 10 mg, oral, Nightly    triamcinolone (Kenalog) 0.1 % cream apply and rub in a thin flim to affected areas twice daily (am and pm)      Vitals  BP Readings from Last 2 Encounters:   04/14/25 138/88   01/13/25 (!) 166/99     BMI Readings from Last 1 Encounters:   04/14/25 37.77 kg/m²      Labs  A1C  Lab Results   Component Value Date    HGBA1C 6.2 04/14/2025    HGBA1C 6.1 (H) 11/11/2024    HGBA1C 5.6 07/30/2024     BMP  Lab Results   Component Value Date    CALCIUM 9.2 11/11/2024     11/11/2024    K 4.2 11/11/2024    CO2 30 11/11/2024     11/11/2024    BUN 16 11/11/2024    CREATININE 1.02 11/11/2024    EGFR 62 11/11/2024     LFTs  Lab Results   Component Value Date    ALT 16 11/11/2024    AST 15 11/11/2024    ALKPHOS 64 11/11/2024    BILITOT 0.3 11/11/2024     FLP  Lab Results   Component Value Date    TRIG 155 (H) 11/11/2024    CHOL 165 11/11/2024    LDLF 104 (H) 06/20/2023    LDLCALC 90 11/11/2024    HDL 44.0 11/11/2024     Urine Microalbumin  Lab Results   Component Value Date    MICROALBCREA 246 (H) 04/14/2025     Weight Management  Wt Readings from Last 3 Encounters:   04/14/25 103 kg (227 lb)   01/13/25 101 kg (222 lb 3.2 oz)   11/11/24 99.4 kg (219 lb 3.2 oz)      There is no height or weight on file to calculate BMI.     Assessment/Plan   Problem List Items Addressed This Visit       Diabetes mellitus type 2, uncomplicated    Patient's goal A1c is < 7%.  Is pt at goal? Yes, patient's most recent A1c from 4/14/2025 was 6.2%  Patient does not check her blood sugars regularly at home, she is not taking any medications that are high risk of hypoglycemia and her Ac was controlled.     Rationale for plan: Patient is ready to increase the dose of Ozempic to 0.5 mg weekly.    Medication Changes:  INCREASE  Ozempic 0.5 mg/dose; inject 0.5 mg once weekly on Sundays    Future Considerations:  Can continue to titrate as needed    Monitoring and Education:  Counseled  patient on the medications including dosing  Answered all patient questions and concerns         Relevant Medications    semaglutide 0.25 mg or 0.5 mg (2 mg/3 mL) pen injector    Other Relevant Orders    Referral to Clinical Pharmacy    Referral to Clinical Pharmacy       Clinical Pharmacist follow-up: 6/16/2025, Telehealth visit    Patient is not followed in [facility]. (If yes, track minutes under compass leeroy at each visit)    Continue all meds under the continuation of care with the referring provider and clinical pharmacy team.    Thank you,  Ceci Parker, PharmD  Clinical Pharmacist - Primary Care  [phone]  5/19/2025    Verbal consent to manage patient's drug therapy was obtained from the patient. They were informed they may decline to participate or withdraw from participation in pharmacy services at any time.         [1]   Allergies  Allergen Reactions    Empagliflozin Other

## 2025-05-15 DIAGNOSIS — E11.9 TYPE 2 DIABETES MELLITUS WITHOUT COMPLICATION, WITHOUT LONG-TERM CURRENT USE OF INSULIN: ICD-10-CM

## 2025-05-15 DIAGNOSIS — E11.9 TYPE 2 DIABETES MELLITUS WITHOUT COMPLICATION, UNSPECIFIED WHETHER LONG TERM INSULIN USE: ICD-10-CM

## 2025-05-15 RX ORDER — SEMAGLUTIDE 0.68 MG/ML
0.25 INJECTION, SOLUTION SUBCUTANEOUS
Qty: 3 ML | Refills: 0 | OUTPATIENT
Start: 2025-05-18

## 2025-05-19 ENCOUNTER — APPOINTMENT (OUTPATIENT)
Dept: PHARMACY | Facility: HOSPITAL | Age: 65
End: 2025-05-19
Payer: MEDICARE

## 2025-05-19 ENCOUNTER — PHARMACY VISIT (OUTPATIENT)
Dept: PHARMACY | Facility: CLINIC | Age: 65
End: 2025-05-19
Payer: COMMERCIAL

## 2025-05-19 DIAGNOSIS — E11.9 TYPE 2 DIABETES MELLITUS WITHOUT COMPLICATION, UNSPECIFIED WHETHER LONG TERM INSULIN USE: ICD-10-CM

## 2025-05-19 DIAGNOSIS — E11.9 TYPE 2 DIABETES MELLITUS WITHOUT COMPLICATION, WITHOUT LONG-TERM CURRENT USE OF INSULIN: ICD-10-CM

## 2025-05-19 PROCEDURE — RXMED WILLOW AMBULATORY MEDICATION CHARGE

## 2025-05-19 NOTE — ASSESSMENT & PLAN NOTE
Patient's goal A1c is < 7%.  Is pt at goal? Yes, patient's most recent A1c from 4/14/2025 was 6.2%  Patient does not check her blood sugars regularly at home, she is not taking any medications that are high risk of hypoglycemia and her Ac was controlled.     Rationale for plan: Patient is ready to increase the dose of Ozempic to 0.5 mg weekly.    Medication Changes:  INCREASE  Ozempic 0.5 mg/dose; inject 0.5 mg once weekly on Sundays    Future Considerations:  Can continue to titrate as needed    Monitoring and Education:  Counseled patient on the medications including dosing  Answered all patient questions and concerns

## 2025-06-16 ENCOUNTER — APPOINTMENT (OUTPATIENT)
Dept: PHARMACY | Facility: HOSPITAL | Age: 65
End: 2025-06-16
Payer: MEDICARE

## 2025-06-16 DIAGNOSIS — E11.9 TYPE 2 DIABETES MELLITUS WITHOUT COMPLICATION, WITHOUT LONG-TERM CURRENT USE OF INSULIN: ICD-10-CM

## 2025-06-16 DIAGNOSIS — E11.9 TYPE 2 DIABETES MELLITUS WITHOUT COMPLICATION, UNSPECIFIED WHETHER LONG TERM INSULIN USE: ICD-10-CM

## 2025-06-16 PROCEDURE — RXMED WILLOW AMBULATORY MEDICATION CHARGE

## 2025-06-16 NOTE — PROGRESS NOTES
Clinical Pharmacy Appointment    Patient ID: Twyla Mendoza is a 64 y.o. female who presents for Diabetes.    Pt is here for Follow Up appointment.     Referring Provider: Adriel Hurtado MD  PCP: Adriel Hurtado MD  Last visit with PCP: 4/14/2025   Next visit with PCP: not scheduled    Subjective   Drug Interactions  No relevant drug interactions were noted.    Medication System Management  Patient's preferred pharmacy:  Home Delivery/ [facility]  Adherence/Organization: No concerns at this time  Affordability/Accessibility: Ozempic approved on  PAP through 4/4/2026    HPI  DIABETES MELLITUS TYPE 2:    Does patient follow with Endocrinology: No  Last optometry exam: June/July 2024  Most recent visit in Podiatry: checks feet regularly -- patient denies sores or cuts on feet today      Current diabetic medications include:  Metformin 500 mg; take 1 tablet BID  Ozempic 0.5 mg/dose; inject 0.5 mg once weekly on Sundays    Clarifications to above regimen: None  Adverse Effects: None    Past diabetic medications include:  Jardiance (allergy)    Glucose Readings:  Patient does not check her blood sugar regularly at home. She is only taking metformin and Ozempic and is not at risk of hypoglycemic episodes.    Lifestyle:  Diet: 2 meals/day. Appetite   BK: brown, sausage, eggs, toast, coffee  DN: meat and vegetables  Snacks: potato chips, popcorn  Drinks: coffee, green tea, water  Exercise:   Chair yoga twice a week, more energy  Tobacco history: Non-smoker    Secondary Prevention:  Statin? Yes  ACE-I/ARB? Yes  Aspirin? Yes    Pertinent PMH Review:  PMH of Pancreatitis: No  PMH of Retinopathy: No  PMH of Urinary Tract Infections: No  PMH of MTC: No  PMH of MEN2: No  UACR/EGFR in last year?: Yes  ALBUMIN/CREATININE RATIO, RANDOM URINE   Date Value Ref Range Status   04/14/2025 246 (H) <30 mg/g creat Final     Comment:        The ADA defines abnormalities in albumin  excretion as follows:     Albuminuria  Category        Result (mg/g creatinine)     Normal to Mildly increased   <30  Moderately increased            Severely increased           > OR = 300     The ADA recommends that at least two of three  specimens collected within a 3-6 month period be  abnormal before considering a patient to be  within a diagnostic category.       Albumin/Creatinine Ratio   Date Value Ref Range Status   04/23/2024 40.0 (H) <30.0 ug/mg Creat Final     Albumin/Creatine Ratio   Date Value Ref Range Status   10/14/2022 85.7 (H) 0.0 - 30.0 ug/mg crt Final     Immunizations:  Influenza? Yes  COVID? No  Pneumonia? No  Shingles? No      Objective   Allergies[1]  Social History     Social History Narrative    Not on file      Medication Review  Current Outpatient Medications   Medication Instructions    amLODIPine (NORVASC) 10 mg, oral, Daily    ascorbic acid, vitamin C, 500 mg capsule 1 tablet, Daily    aspirin 81 mg EC tablet Take by mouth.    chlorthalidone (HYGROTON) 25 mg, oral, Daily    cholecalciferol (Vitamin D-3) 5,000 Units tablet 1 tablet, Daily    fluocinonide (Lidex) 0.05 % ointment Topical, 2 times daily    fluticasone (Flonase) 50 mcg/actuation nasal spray 2 sprays, Each Nostril, Daily    magnesium gluconate (Magonate) 27.5 mg magne- sium (500 mg) tablet 1 tablet, Daily    melatonin 10 mg, oral, Nightly    metFORMIN (GLUCOPHAGE) 500 mg, oral, 2 times daily    metoprolol succinate XL (TOPROL-XL) 50 mg, oral, Daily    multivit-min-mfolate-K-[id number] (Alive Women's 50 Plus Ultra) 800 mcg DFE- 150 mcg tablet 1 tablet, Daily    olmesartan (BENICAR) 40 mg, oral, Daily    OneTouch Ultra Test strip 2 times daily    polyethylene glycol-electrolytes (polyethylene glycol) 420 gram solution Take by mouth.    psyllium husk, aspartame, (Metamucil Sugar-Free, aspart,) 3.4 gram/5.8 gram powder 2 times daily    rosuvastatin (CRESTOR) 10 mg, oral, Daily    semaglutide (OZEMPIC) 1 mg, subcutaneous, Weekly    solifenacin (VESICARE) 10 mg,  oral, Nightly    triamcinolone (Kenalog) 0.1 % cream apply and rub in a thin flim to affected areas twice daily (am and pm)      Vitals  BP Readings from Last 2 Encounters:   04/14/25 138/88   01/13/25 (!) 166/99     BMI Readings from Last 1 Encounters:   04/14/25 37.77 kg/m²      Labs  A1C  Lab Results   Component Value Date    HGBA1C 6.2 04/14/2025    HGBA1C 6.1 (H) 11/11/2024    HGBA1C 5.6 07/30/2024     BMP  Lab Results   Component Value Date    CALCIUM 9.2 11/11/2024     11/11/2024    K 4.2 11/11/2024    CO2 30 11/11/2024     11/11/2024    BUN 16 11/11/2024    CREATININE 1.02 11/11/2024    EGFR 62 11/11/2024     LFTs  Lab Results   Component Value Date    ALT 16 11/11/2024    AST 15 11/11/2024    ALKPHOS 64 11/11/2024    BILITOT 0.3 11/11/2024     FLP  Lab Results   Component Value Date    TRIG 155 (H) 11/11/2024    CHOL 165 11/11/2024    LDLF 104 (H) 06/20/2023    LDLCALC 90 11/11/2024    HDL 44.0 11/11/2024     Urine Microalbumin  Lab Results   Component Value Date    MICROALBCREA 246 (H) 04/14/2025     Weight Management  Wt Readings from Last 3 Encounters:   04/14/25 103 kg (227 lb)   01/13/25 101 kg (222 lb 3.2 oz)   11/11/24 99.4 kg (219 lb 3.2 oz)      There is no height or weight on file to calculate BMI.     Assessment/Plan   Problem List Items Addressed This Visit       Diabetes mellitus type 2, uncomplicated    Patient's goal A1c is < 7%.  Is pt at goal? Yes, patient's most recent A1c from 4/14/2025 was 6.2%  Patient's SMBGs are coming down, patient states that when she checks her blood sugar a couple times a week, her blood sugars are improving.     Rationale for plan: At this time, will increase Ozempic to 1 mg, she is doing well with the 0.5 mg and wants to go back up where she was previously.    Medication Changes:  CONTINUE  Metformin 500 mg; take 1 tablet BID  INCREASE  Ozempic 1 mg/dose; inject 1 mg once weekly on Sundays    Future Considerations:  If needed, can titrate up to max  dose of 2 mg    Monitoring and Education:  Counseled patient on medications  Answered all patient questions and concerns at this time         Relevant Medications    semaglutide (OZEMPIC) 1 mg/dose (4 mg/3 mL) pen injector    Other Relevant Orders    Referral to Clinical Pharmacy    Referral to Clinical Pharmacy       Clinical Pharmacist follow-up: 7/14/2025, Telehealth visit    Patient is not followed in [facility]. (If yes, track minutes under compass leeroy at each visit)    Continue all meds under the continuation of care with the referring provider and clinical pharmacy team.    Thank you,  Ceci Parker, PharmD  Clinical Pharmacist - Primary Care  [phone]  6/16/2025    Verbal consent to manage patient's drug therapy was obtained from the patient. They were informed they may decline to participate or withdraw from participation in pharmacy services at any time.         [1]   Allergies  Allergen Reactions    Empagliflozin Other

## 2025-06-16 NOTE — ASSESSMENT & PLAN NOTE
Patient's goal A1c is < 7%.  Is pt at goal? Yes, patient's most recent A1c from 4/14/2025 was 6.2%  Patient's SMBGs are coming down, patient states that when she checks her blood sugar a couple times a week, her blood sugars are improving.     Rationale for plan: At this time, will increase Ozempic to 1 mg, she is doing well with the 0.5 mg and wants to go back up where she was previously.    Medication Changes:  CONTINUE  Metformin 500 mg; take 1 tablet BID  INCREASE  Ozempic 1 mg/dose; inject 1 mg once weekly on Sundays    Future Considerations:  If needed, can titrate up to max dose of 2 mg    Monitoring and Education:  Counseled patient on medications  Answered all patient questions and concerns at this time

## 2025-06-18 ENCOUNTER — PHARMACY VISIT (OUTPATIENT)
Dept: PHARMACY | Facility: CLINIC | Age: 65
End: 2025-06-18
Payer: COMMERCIAL

## 2025-07-14 ENCOUNTER — APPOINTMENT (OUTPATIENT)
Dept: PHARMACY | Facility: HOSPITAL | Age: 65
End: 2025-07-14
Payer: MEDICARE

## 2025-07-14 DIAGNOSIS — E11.9 TYPE 2 DIABETES MELLITUS WITHOUT COMPLICATION, UNSPECIFIED WHETHER LONG TERM INSULIN USE: ICD-10-CM

## 2025-07-14 DIAGNOSIS — E11.9 TYPE 2 DIABETES MELLITUS WITHOUT COMPLICATION, WITHOUT LONG-TERM CURRENT USE OF INSULIN: ICD-10-CM

## 2025-07-14 NOTE — ASSESSMENT & PLAN NOTE
Patient's goal A1c is < 7%.  Is pt at goal? Yes, patient's most recent A1c from 4/14/2025 was 6.2%  Patient does not check her blood sugar regularly at home, she is only taking metformin and Ozempic and is not experiencing any low blood sugar.     Rationale for plan: Patient at this time wants to continue with the Ozempic 1 mg dose, she is well-controlled at this time.    Medication Changes:  CONTINUE  Metformin 500 mg; take 1 tablet BID  Ozempic 1 mg/dose; inject 1 mg once weekly on Sundays    Monitoring and Education:  Counseled patient on medications   Answered all patient questions and concerns

## 2025-07-14 NOTE — PROGRESS NOTES
Clinical Pharmacy Appointment    Patient ID: Twyla Mendoza is a 64 y.o. female who presents for Diabetes.    Pt is here for Follow Up appointment.     Referring Provider: Adriel Hurtado MD  PCP: Adriel Hurtado MD  Last visit with PCP: 4/14/2025   Next visit with PCP: not scheduled    Subjective   Drug Interactions  No relevant drug interactions were noted.    Medication System Management  [facility]  Adherence/Organization: No concerns at this time  Affordability/Accessibility: Ozempic approved on  PAP through 4/4/2026      HPI  DIABETES MELLITUS TYPE 2:    Does patient follow with Endocrinology: No  Last optometry exam: due for a new exam?  Most recent visit in Podiatry: checks feet regularly-- patient denies sores or cuts on feet today      Current diabetic medications include:  Metformin 500 mg; take 1 tablet BID  Ozempic 1 mg/dose; inject 1 mg once weekly on Sundays    Clarifications to above regimen: None  Adverse Effects: None    Past diabetic medications include:  Jardiance (allergy)    Glucose Readings:  Patient does not check her blood sugar regularly at home. She is only taking metformin and Ozempic and is not at risk of hypoglycemic episodes.    Lifestyle:  Diet: 2 meals/day. No changes.  BK: brown, sausage, eggs, toast, coffee  DN: meat and vegetables  Snacks: potato chips, popcorn  Drinks: coffee, green tea, water  Exercise:   Chair yoga twice a week  Tobacco history: Non-smoker    Secondary Prevention:  Statin? Yes  ACE-I/ARB? Yes  Aspirin? Yes    Pertinent PMH Review:  PMH of Pancreatitis: No  PMH of Retinopathy: No  PMH of Urinary Tract Infections: No  PMH of MTC: No  PMH of MEN2: No  UACR/EGFR in last year?: Yes  ALBUMIN/CREATININE RATIO, RANDOM URINE   Date Value Ref Range Status   04/14/2025 246 (H) <30 mg/g creat Final     Comment:        The ADA defines abnormalities in albumin  excretion as follows:     Albuminuria Category         Result (mg/g creatinine)     Normal to Mildly increased   <30  Moderately increased            Severely increased           > OR = 300     The ADA recommends that at least two of three  specimens collected within a 3-6 month period be  abnormal before considering a patient to be  within a diagnostic category.       Albumin/Creatinine Ratio   Date Value Ref Range Status   04/23/2024 40.0 (H) <30.0 ug/mg Creat Final     Albumin/Creatine Ratio   Date Value Ref Range Status   10/14/2022 85.7 (H) 0.0 - 30.0 ug/mg crt Final     Immunizations:  Influenza? Yes  COVID? No  Pneumonia? No  Shingles? No      Objective   Allergies[1]  Social History     Social History Narrative    Not on file      Medication Review  Current Outpatient Medications   Medication Instructions    amLODIPine (NORVASC) 10 mg, oral, Daily    ascorbic acid, vitamin C, 500 mg capsule 1 tablet, Daily    aspirin 81 mg EC tablet Take by mouth.    chlorthalidone (HYGROTON) 25 mg, oral, Daily    cholecalciferol (Vitamin D-3) 5,000 Units tablet 1 tablet, Daily    fluocinonide (Lidex) 0.05 % ointment Topical, 2 times daily    fluticasone (Flonase) 50 mcg/actuation nasal spray 2 sprays, Each Nostril, Daily    magnesium gluconate (Magonate) 27.5 mg magne- sium (500 mg) tablet 1 tablet, Daily    melatonin 10 mg, oral, Nightly    metFORMIN (GLUCOPHAGE) 500 mg, oral, 2 times daily    metoprolol succinate XL (TOPROL-XL) 50 mg, oral, Daily    multivit-min-mfolate-K-wxig660 (Alive Women's 50 Plus Ultra) 800 mcg DFE- 150 mcg tablet 1 tablet, Daily    olmesartan (BENICAR) 40 mg, oral, Daily    OneTouch Ultra Test strip 2 times daily    Ozempic 1 mg, subcutaneous, Weekly    polyethylene glycol-electrolytes (polyethylene glycol) 420 gram solution Take by mouth.    psyllium husk, aspartame, (Metamucil Sugar-Free, aspart,) 3.4 gram/5.8 gram powder 2 times daily    rosuvastatin (CRESTOR) 10 mg, oral, Daily    solifenacin (VESICARE) 10 mg, oral, Nightly     triamcinolone (Kenalog) 0.1 % cream apply and rub in a thin flim to affected areas twice daily (am and pm)      Vitals  BP Readings from Last 2 Encounters:   04/14/25 138/88   01/13/25 (!) 166/99     BMI Readings from Last 1 Encounters:   04/14/25 37.77 kg/m²      Labs  A1C  Lab Results   Component Value Date    HGBA1C 6.2 04/14/2025    HGBA1C 6.1 (H) 11/11/2024    HGBA1C 5.6 07/30/2024     BMP  Lab Results   Component Value Date    CALCIUM 9.2 11/11/2024     11/11/2024    K 4.2 11/11/2024    CO2 30 11/11/2024     11/11/2024    BUN 16 11/11/2024    CREATININE 1.02 11/11/2024    EGFR 62 11/11/2024     LFTs  Lab Results   Component Value Date    ALT 16 11/11/2024    AST 15 11/11/2024    ALKPHOS 64 11/11/2024    BILITOT 0.3 11/11/2024     FLP  Lab Results   Component Value Date    TRIG 155 (H) 11/11/2024    CHOL 165 11/11/2024    LDLF 104 (H) 06/20/2023    LDLCALC 90 11/11/2024    HDL 44.0 11/11/2024     Urine Microalbumin  Lab Results   Component Value Date    MICROALBCREA 246 (H) 04/14/2025     Weight Management  Wt Readings from Last 3 Encounters:   04/14/25 103 kg (227 lb)   01/13/25 101 kg (222 lb 3.2 oz)   11/11/24 99.4 kg (219 lb 3.2 oz)      There is no height or weight on file to calculate BMI.     Assessment/Plan   Problem List Items Addressed This Visit       Diabetes mellitus type 2, uncomplicated    Patient's goal A1c is < 7%.  Is pt at goal? Yes, patient's most recent A1c from 4/14/2025 was 6.2%  Patient does not check her blood sugar regularly at home, she is only taking metformin and Ozempic and is not experiencing any low blood sugar.     Rationale for plan: Patient at this time wants to continue with the Ozempic 1 mg dose, she is well-controlled at this time.    Medication Changes:  CONTINUE  Metformin 500 mg; take 1 tablet BID  Ozempic 1 mg/dose; inject 1 mg once weekly on Sundays    Monitoring and Education:  Counseled patient on medications   Answered all patient questions and  concerns         Relevant Orders    Referral to Clinical Pharmacy    Referral to Clinical Pharmacy       Clinical Pharmacist follow-up: 8/11/2025, Telehealth visit    Patient is not followed in [facility]. (If yes, track minutes under compass leeroy at each visit)    Continue all meds under the continuation of care with the referring provider and clinical pharmacy team.    Thank you,  Ceci Parker, PharmD  Clinical Pharmacist - Primary Care  [phone]  7/14/2025    Verbal consent to manage patient's drug therapy was obtained from the patient. They were informed they may decline to participate or withdraw from participation in pharmacy services at any time.           [1]   Allergies  Allergen Reactions    Empagliflozin Other

## 2025-07-29 PROCEDURE — RXMED WILLOW AMBULATORY MEDICATION CHARGE

## 2025-07-30 ENCOUNTER — PHARMACY VISIT (OUTPATIENT)
Dept: PHARMACY | Facility: CLINIC | Age: 65
End: 2025-07-30
Payer: COMMERCIAL

## 2025-08-11 ENCOUNTER — APPOINTMENT (OUTPATIENT)
Dept: PHARMACY | Facility: HOSPITAL | Age: 65
End: 2025-08-11
Payer: MEDICARE

## 2025-08-11 DIAGNOSIS — E11.9 TYPE 2 DIABETES MELLITUS WITHOUT COMPLICATION, WITHOUT LONG-TERM CURRENT USE OF INSULIN: Primary | ICD-10-CM

## 2025-08-27 PROCEDURE — RXMED WILLOW AMBULATORY MEDICATION CHARGE

## 2025-09-02 ENCOUNTER — PHARMACY VISIT (OUTPATIENT)
Dept: PHARMACY | Facility: CLINIC | Age: 65
End: 2025-09-02
Payer: COMMERCIAL

## 2025-09-08 ENCOUNTER — APPOINTMENT (OUTPATIENT)
Dept: PHARMACY | Facility: HOSPITAL | Age: 65
End: 2025-09-08
Payer: MEDICARE

## 2025-09-10 ENCOUNTER — APPOINTMENT (OUTPATIENT)
Dept: PRIMARY CARE | Facility: CLINIC | Age: 65
End: 2025-09-10
Payer: MEDICARE